# Patient Record
Sex: FEMALE | Race: WHITE | NOT HISPANIC OR LATINO | Employment: PART TIME | URBAN - METROPOLITAN AREA
[De-identification: names, ages, dates, MRNs, and addresses within clinical notes are randomized per-mention and may not be internally consistent; named-entity substitution may affect disease eponyms.]

---

## 2017-01-16 LAB — CRC RECOMMENDATION EXT: NORMAL

## 2017-05-22 ENCOUNTER — LAB VISIT (OUTPATIENT)
Dept: LAB | Facility: HOSPITAL | Age: 67
End: 2017-05-22
Attending: INTERNAL MEDICINE
Payer: MEDICARE

## 2017-05-22 DIAGNOSIS — N18.30 CKD (CHRONIC KIDNEY DISEASE) STAGE 3, GFR 30-59 ML/MIN: ICD-10-CM

## 2017-05-22 LAB
ALBUMIN SERPL BCP-MCNC: 3.6 G/DL
ANION GAP SERPL CALC-SCNC: 8 MMOL/L
BUN SERPL-MCNC: 18 MG/DL
CALCIUM SERPL-MCNC: 9.6 MG/DL
CHLORIDE SERPL-SCNC: 106 MMOL/L
CO2 SERPL-SCNC: 26 MMOL/L
CREAT SERPL-MCNC: 1 MG/DL
EST. GFR  (AFRICAN AMERICAN): >60 ML/MIN/1.73 M^2
EST. GFR  (NON AFRICAN AMERICAN): 58 ML/MIN/1.73 M^2
GLUCOSE SERPL-MCNC: 90 MG/DL
PHOSPHATE SERPL-MCNC: 3 MG/DL
POTASSIUM SERPL-SCNC: 4.6 MMOL/L
SODIUM SERPL-SCNC: 140 MMOL/L

## 2017-05-22 PROCEDURE — 80069 RENAL FUNCTION PANEL: CPT

## 2017-05-22 PROCEDURE — 36415 COLL VENOUS BLD VENIPUNCTURE: CPT | Mod: PO

## 2017-07-31 ENCOUNTER — TELEPHONE (OUTPATIENT)
Dept: NEPHROLOGY | Facility: CLINIC | Age: 67
End: 2017-07-31

## 2017-07-31 NOTE — TELEPHONE ENCOUNTER
----- Message from Basilia Greene sent at 2017 11:14 AM CDT -----  Contact: Pt.  Has an appt with him today at 2:40 p.m. Normally has lab work done before the appt.  Nothing scheduled and last orders states 3/4/2016 ().  Should her appt be rescheduled so lab work can be done first?  Please call her at [phone].

## 2017-08-28 ENCOUNTER — TELEPHONE (OUTPATIENT)
Dept: NEPHROLOGY | Facility: CLINIC | Age: 67
End: 2017-08-28

## 2017-08-28 ENCOUNTER — LAB VISIT (OUTPATIENT)
Dept: LAB | Facility: HOSPITAL | Age: 67
End: 2017-08-28
Attending: INTERNAL MEDICINE
Payer: MEDICARE

## 2017-08-28 DIAGNOSIS — N18.30 CKD (CHRONIC KIDNEY DISEASE) STAGE 3, GFR 30-59 ML/MIN: ICD-10-CM

## 2017-08-28 LAB
ALBUMIN SERPL BCP-MCNC: 3.9 G/DL
ANION GAP SERPL CALC-SCNC: 9 MMOL/L
BUN SERPL-MCNC: 17 MG/DL
CALCIUM SERPL-MCNC: 10.1 MG/DL
CHLORIDE SERPL-SCNC: 106 MMOL/L
CO2 SERPL-SCNC: 24 MMOL/L
CREAT SERPL-MCNC: 1.2 MG/DL
EST. GFR  (AFRICAN AMERICAN): 53.7 ML/MIN/1.73 M^2
EST. GFR  (NON AFRICAN AMERICAN): 46.5 ML/MIN/1.73 M^2
GLUCOSE SERPL-MCNC: 111 MG/DL
PHOSPHATE SERPL-MCNC: 2.8 MG/DL
POTASSIUM SERPL-SCNC: 4.2 MMOL/L
SODIUM SERPL-SCNC: 139 MMOL/L

## 2017-08-28 PROCEDURE — 80069 RENAL FUNCTION PANEL: CPT

## 2017-08-28 PROCEDURE — 36415 COLL VENOUS BLD VENIPUNCTURE: CPT | Mod: PO

## 2017-08-28 NOTE — TELEPHONE ENCOUNTER
----- Message from Divine Fraser sent at 8/28/2017  3:00 PM CDT -----  Contact: Patient  Patient needs to know if the all of the orders  was sent over to the labs, Please call her at [phone]. The one that she is looking for is the urine analysis.    Thanks  td

## 2017-09-06 ENCOUNTER — OFFICE VISIT (OUTPATIENT)
Dept: NEPHROLOGY | Facility: CLINIC | Age: 67
End: 2017-09-06
Payer: MEDICARE

## 2017-09-06 VITALS
HEIGHT: 63 IN | SYSTOLIC BLOOD PRESSURE: 110 MMHG | HEART RATE: 70 BPM | BODY MASS INDEX: 23.25 KG/M2 | DIASTOLIC BLOOD PRESSURE: 60 MMHG | WEIGHT: 131.19 LBS

## 2017-09-06 DIAGNOSIS — N18.30 CKD (CHRONIC KIDNEY DISEASE) STAGE 3, GFR 30-59 ML/MIN: Primary | ICD-10-CM

## 2017-09-06 PROCEDURE — 99999 PR PBB SHADOW E&M-EST. PATIENT-LVL III: CPT | Mod: PBBFAC,,, | Performed by: INTERNAL MEDICINE

## 2017-09-06 PROCEDURE — 99213 OFFICE O/P EST LOW 20 MIN: CPT | Mod: S$PBB,,, | Performed by: INTERNAL MEDICINE

## 2017-09-06 PROCEDURE — 1159F MED LIST DOCD IN RCRD: CPT | Mod: ,,, | Performed by: INTERNAL MEDICINE

## 2017-09-06 PROCEDURE — 99213 OFFICE O/P EST LOW 20 MIN: CPT | Mod: PBBFAC | Performed by: INTERNAL MEDICINE

## 2017-09-06 PROCEDURE — 1126F AMNT PAIN NOTED NONE PRSNT: CPT | Mod: ,,, | Performed by: INTERNAL MEDICINE

## 2017-09-06 NOTE — PROGRESS NOTES
PROGRESS NOTE FOR ESTABLISHED PATIENT    PHYSICIAN REQUESTING THE CONSULT: Dr. Madisyn Cruz     REASON FOR VISIT: Renal insufficiency     67 y.o. female with history of bipolar disorder, hypothyroidism presents to the renal clinic for evaluation of renal insufficiency.     Patient denies any pain, SOB, nausea, LE edema, dizziness. She continues to use lithium.         Past Medical History    Diagnosis  Date      Bipolar 1 disorder       Hypothyroidism       Osteopenia       Past Surgical History    Procedure  Date      Cyst removal       [place] tooth extraction       Tonsillectomy       History      Social History      Marital Status:        Spouse Name:  N/A      Number of Children:  N/A      Years of Education:  N/A      Occupational History      Not on file.      Social History Main Topics      Smoking status:  Never Smoker      Smokeless tobacco:  Never Used      Alcohol Use:  No      Drug Use:  No      Sexually Active:  Not on file      Other Topics  Concern      Not on file      Social History Narrative      No narrative on file      Family History    Problem  Relation  Age of Onset      Heart failure  Mother       Heart failure  Brother       Diabetes  Brother       Cancer  Brother       Breast cancer  Maternal Aunt       No Known Allergies   Current Outpatient Prescriptions    Medication  Sig  Dispense  Refill      azelastine 0.15 % (205.5 mcg) Spry  1 spray by Nasal route.        buPROPion (WELLBUTRIN XL) 300 MG 24 hr tablet  Take 300 mg by mouth once daily.        calcium-vitamin D3 (CALCIUM 500 + D) 500 mg(1,250mg) -200 unit per tablet  Take 1 tablet by mouth 2 (two) times daily with meals.        clonazepam (KLONOPIN) 0.5 MG tablet  Take 0.5 mg by mouth as needed.        duloxetine (CYMBALTA) 60 MG capsule  Take 60 mg by mouth once daily.        ergocalciferol (VITAMIN D2) 50,000 unit Cap  Take 50,000 Units by mouth every 7 days.        fish oil-omega-3 fatty acids  "300-1,000 mg capsule  Take 1 g by mouth once daily.        lamotrigine (LAMICTAL) 100 MG tablet  Take 100 mg by mouth once daily.        lithium (ESKALITH) 300 MG capsule  Take 300 mg by mouth 2 (two) times daily.        magnesium oxide-Mg AA chelate (MG-PLUS-PROTEIN) 133 mg Tab  Take by mouth.        quetiapine (SEROQUEL XR) 150 mg Tb24  Take by mouth once daily.        thyroid, pork, (ARMOUR THYROID) 15 mg Tab  Take by mouth.        trifluoperazine (STELAZINE) 5 MG tablet  Take 5 mg by mouth as needed.        Review of Systems:   1. GENERAL: patient denies any fever, weight changes, generalized weakness, dizziness.   2. HEENT: patient denies headaches, visual disturbances, swallowing problems, sinus pain, nasal congestion.   3. CARDIOVASCULAR: patient denies chest pain, palpitations.   4. PULMONARY: patient denies SOB, coughing, hemoptysis, wheezing.   5. GASTROINTESTINAL: patient denies abdominal pain, nausea, vomiting, diarrhea.   6. GENITOURINARY: patient denies dysuria, hematuria, hesitancy, frequency.   7. EXTREMITIES: patient denies LE edema, LE cramping.   8. DERMATOLOGY: patient denies rashes, ulcers.   9. NEURO: patient denies tremors, extremity weakness, extremity numbness/tingling.   10. MUSCULOSKELETAL: patient denies joint pain, joint swelling.   11. HEMATOLOGY: patient denies rectal or gum bleeding.   12: PSYCH: patient denies anxiety, depression.       PHYSICAL EXAM:   /60   Pulse 70   Ht 5' 3" (1.6 m)   Wt 59.5 kg (131 lb 2.8 oz)   BMI 23.24 kg/m²     GENERAL: Slender pleasant lady presents to renal clinic   HEENT: PERRL, no nasal discharge, no icterus, no oral exudates, moist mucosal membranes.   NECK: no thyroid mass, no lymphadenopathy.   HEART: RRR S1/S2, no rubs, good peripheral pulses.   LUNGS: CTA bilaterally, no wheezing, breathing is nonlabored.   ABDOMEN: soft, nontender, not distended, bowel sounds are present, no abdominal hernia.   EXTREM: no LE edema.   SKIN: bruise in " right periorbital area and right chin, skin is warm and dry.   NEURO: A & O x 3, no obvious focal signs.       LABORATORY RESULTS:   Lab Results   Component Value Date    CREATININE 1.2 08/28/2017    BUN 17 08/28/2017     08/28/2017    K 4.2 08/28/2017     08/28/2017    CO2 24 08/28/2017     Lab Results   Component Value Date    CALCIUM 10.1 08/28/2017    PHOS 2.8 08/28/2017     Lab Results   Component Value Date    ALBUMIN 3.9 08/28/2017     Lab Results   Component Value Date    WBC 3.89 (L) 02/18/2014    HGB 12.9 02/18/2014    HCT 38.6 02/18/2014    MCV 91 02/18/2014     (L) 02/18/2014     Urinalysis: no protein, no RBC (2/15/16)     ASSESSMENT AND PLAN:   67 y.o. female with history of bipolar disorder and hypothyroidism presents to the renal clinic for evaluation of renal insufficiency.     1. Renal insufficiency: Patient's renal function has stabilized with creatinine at 1.2. She continues to be on lithium since her psychiatrist feels that lithium is important for her.  Patient's renal function will be monitored closely and she will return in 6-9 months for follow up. If there is a tendency of worsening renal function with increasing creatinine, lithium should be discontinued.     2. Electrolytes: at goal.     3. Acid base status: No acute issues.     4. Volume: Euvolemic.     5. Blood pressure: Good BP control.     6. Medications: Reviewed. Lithium will be continued for now.

## 2018-09-06 ENCOUNTER — LAB VISIT (OUTPATIENT)
Dept: LAB | Facility: HOSPITAL | Age: 68
End: 2018-09-06
Attending: INTERNAL MEDICINE
Payer: MEDICARE

## 2018-09-06 DIAGNOSIS — N18.30 CKD (CHRONIC KIDNEY DISEASE) STAGE 3, GFR 30-59 ML/MIN: ICD-10-CM

## 2018-09-06 LAB
ALBUMIN SERPL BCP-MCNC: 4 G/DL
ALBUMIN SERPL BCP-MCNC: 4 G/DL
ANION GAP SERPL CALC-SCNC: 5 MMOL/L
ANION GAP SERPL CALC-SCNC: 5 MMOL/L
BUN SERPL-MCNC: 23 MG/DL
BUN SERPL-MCNC: 23 MG/DL
CALCIUM SERPL-MCNC: 10.6 MG/DL
CALCIUM SERPL-MCNC: 10.6 MG/DL
CHLORIDE SERPL-SCNC: 108 MMOL/L
CHLORIDE SERPL-SCNC: 108 MMOL/L
CO2 SERPL-SCNC: 27 MMOL/L
CO2 SERPL-SCNC: 27 MMOL/L
CREAT SERPL-MCNC: 1.2 MG/DL
CREAT SERPL-MCNC: 1.2 MG/DL
EST. GFR  (AFRICAN AMERICAN): 53.3 ML/MIN/1.73 M^2
EST. GFR  (AFRICAN AMERICAN): 53.3 ML/MIN/1.73 M^2
EST. GFR  (NON AFRICAN AMERICAN): 46.2 ML/MIN/1.73 M^2
EST. GFR  (NON AFRICAN AMERICAN): 46.2 ML/MIN/1.73 M^2
GLUCOSE SERPL-MCNC: 89 MG/DL
GLUCOSE SERPL-MCNC: 89 MG/DL
PHOSPHATE SERPL-MCNC: 3.6 MG/DL
PHOSPHATE SERPL-MCNC: 3.6 MG/DL
POTASSIUM SERPL-SCNC: 4.7 MMOL/L
POTASSIUM SERPL-SCNC: 4.7 MMOL/L
SODIUM SERPL-SCNC: 140 MMOL/L
SODIUM SERPL-SCNC: 140 MMOL/L

## 2018-09-06 PROCEDURE — 80069 RENAL FUNCTION PANEL: CPT

## 2018-09-06 PROCEDURE — 36415 COLL VENOUS BLD VENIPUNCTURE: CPT | Mod: PO

## 2018-09-11 ENCOUNTER — TELEPHONE (OUTPATIENT)
Dept: NEPHROLOGY | Facility: CLINIC | Age: 68
End: 2018-09-11

## 2018-09-11 NOTE — TELEPHONE ENCOUNTER
----- Message from Pamela Sánchez sent at 9/11/2018 10:17 AM CDT -----  Contact: self [phone]  Would like return call from nurse.  Please call back at [phone].  Md Karan

## 2018-09-11 NOTE — TELEPHONE ENCOUNTER
Returned call to patient. She is requesting a call with lab results. Patient had to cancel today due to traffic. Please advise

## 2019-01-23 ENCOUNTER — TELEPHONE (OUTPATIENT)
Dept: NEPHROLOGY | Facility: CLINIC | Age: 69
End: 2019-01-23

## 2019-01-23 NOTE — TELEPHONE ENCOUNTER
----- Message from Keke Guerrero sent at 1/23/2019  3:45 PM CST -----  Contact: pt  The pt request a return call, no additional info given, the pt can be reached at [phone]///[id number]

## 2019-01-23 NOTE — TELEPHONE ENCOUNTER
Returned call to patient who states that she would like to have some labs done or do you prefer for her to wait until February with a follow up visit with you? Please advise

## 2019-01-24 NOTE — TELEPHONE ENCOUNTER
Called and left message for patient to return the call. She can repeat labs and schedule a follow up with Dr. Echols next month.

## 2019-02-06 LAB — BMD RECOMMENDATION EXT: NORMAL

## 2020-03-03 ENCOUNTER — TELEPHONE (OUTPATIENT)
Dept: NEPHROLOGY | Facility: CLINIC | Age: 70
End: 2020-03-03

## 2020-03-03 DIAGNOSIS — N18.30 CKD (CHRONIC KIDNEY DISEASE) STAGE 3, GFR 30-59 ML/MIN: Primary | ICD-10-CM

## 2020-03-03 NOTE — TELEPHONE ENCOUNTER
Called to schedule appt she wanted sooner than June so offered this week. She declined .the next avail was may3/3/2020/1042/sf

## 2020-04-30 ENCOUNTER — LAB VISIT (OUTPATIENT)
Dept: LAB | Facility: HOSPITAL | Age: 70
End: 2020-04-30
Attending: INTERNAL MEDICINE
Payer: MEDICARE

## 2020-04-30 ENCOUNTER — TELEPHONE (OUTPATIENT)
Dept: NEPHROLOGY | Facility: CLINIC | Age: 70
End: 2020-04-30

## 2020-04-30 DIAGNOSIS — N18.30 CKD (CHRONIC KIDNEY DISEASE) STAGE 3, GFR 30-59 ML/MIN: ICD-10-CM

## 2020-04-30 LAB
ALBUMIN SERPL BCP-MCNC: 4 G/DL (ref 3.5–5.2)
ANION GAP SERPL CALC-SCNC: 4 MMOL/L (ref 8–16)
BASOPHILS # BLD AUTO: 0.01 K/UL (ref 0–0.2)
BASOPHILS NFR BLD: 0.3 % (ref 0–1.9)
BUN SERPL-MCNC: 15 MG/DL (ref 8–23)
CALCIUM SERPL-MCNC: 10.3 MG/DL (ref 8.7–10.5)
CHLORIDE SERPL-SCNC: 103 MMOL/L (ref 95–110)
CO2 SERPL-SCNC: 31 MMOL/L (ref 23–29)
CREAT SERPL-MCNC: 1.2 MG/DL (ref 0.5–1.4)
DIFFERENTIAL METHOD: ABNORMAL
EOSINOPHIL # BLD AUTO: 0 K/UL (ref 0–0.5)
EOSINOPHIL NFR BLD: 0 % (ref 0–8)
ERYTHROCYTE [DISTWIDTH] IN BLOOD BY AUTOMATED COUNT: 13.3 % (ref 11.5–14.5)
EST. GFR  (AFRICAN AMERICAN): 52.2 ML/MIN/1.73 M^2
EST. GFR  (NON AFRICAN AMERICAN): 45.3 ML/MIN/1.73 M^2
GLUCOSE SERPL-MCNC: 84 MG/DL (ref 70–110)
HCT VFR BLD AUTO: 41.6 % (ref 37–48.5)
HGB BLD-MCNC: 13 G/DL (ref 12–16)
IMM GRANULOCYTES # BLD AUTO: 0 K/UL (ref 0–0.04)
IMM GRANULOCYTES NFR BLD AUTO: 0 % (ref 0–0.5)
LYMPHOCYTES # BLD AUTO: 1 K/UL (ref 1–4.8)
LYMPHOCYTES NFR BLD: 32.3 % (ref 18–48)
MCH RBC QN AUTO: 30.1 PG (ref 27–31)
MCHC RBC AUTO-ENTMCNC: 31.3 G/DL (ref 32–36)
MCV RBC AUTO: 96 FL (ref 82–98)
MONOCYTES # BLD AUTO: 0.2 K/UL (ref 0.3–1)
MONOCYTES NFR BLD: 7.4 % (ref 4–15)
NEUTROPHILS # BLD AUTO: 1.8 K/UL (ref 1.8–7.7)
NEUTROPHILS NFR BLD: 60 % (ref 38–73)
NRBC BLD-RTO: 0 /100 WBC
PHOSPHATE SERPL-MCNC: 3.3 MG/DL (ref 2.7–4.5)
PLATELET # BLD AUTO: 132 K/UL (ref 150–350)
PMV BLD AUTO: 12.7 FL (ref 9.2–12.9)
POTASSIUM SERPL-SCNC: 4.5 MMOL/L (ref 3.5–5.1)
PTH-INTACT SERPL-MCNC: 76 PG/ML (ref 9–77)
RBC # BLD AUTO: 4.32 M/UL (ref 4–5.4)
SODIUM SERPL-SCNC: 138 MMOL/L (ref 136–145)
WBC # BLD AUTO: 2.97 K/UL (ref 3.9–12.7)

## 2020-04-30 PROCEDURE — 83970 ASSAY OF PARATHORMONE: CPT

## 2020-04-30 PROCEDURE — 36415 COLL VENOUS BLD VENIPUNCTURE: CPT | Mod: PO

## 2020-04-30 PROCEDURE — 80069 RENAL FUNCTION PANEL: CPT

## 2020-04-30 PROCEDURE — 85025 COMPLETE CBC W/AUTO DIFF WBC: CPT

## 2020-04-30 NOTE — TELEPHONE ENCOUNTER
----- Message from Thu Pearce sent at 4/30/2020  8:17 AM CDT -----  Contact: pt  States she has to do her lab work today. She has an appt on 5/4 to see Dr Echols and she would like to see if we can just call her with her test results, rather than her coming in. Please call pt [phone]. Thank you

## 2020-04-30 NOTE — TELEPHONE ENCOUNTER
Returned call and spent 20 min walking her through the my chart process. She is now redy for vv .4/30/2020/0845/sf

## 2020-05-04 ENCOUNTER — OFFICE VISIT (OUTPATIENT)
Dept: NEPHROLOGY | Facility: CLINIC | Age: 70
End: 2020-05-04
Payer: MEDICARE

## 2020-05-04 DIAGNOSIS — N18.30 CKD (CHRONIC KIDNEY DISEASE) STAGE 3, GFR 30-59 ML/MIN: Primary | ICD-10-CM

## 2020-05-04 PROCEDURE — 99442 PR PHYSICIAN TELEPHONE EVALUATION 11-20 MIN: ICD-10-PCS | Mod: 95,,, | Performed by: INTERNAL MEDICINE

## 2020-05-04 PROCEDURE — 99442 PR PHYSICIAN TELEPHONE EVALUATION 11-20 MIN: CPT | Mod: 95,,, | Performed by: INTERNAL MEDICINE

## 2020-05-04 NOTE — PROGRESS NOTES
PROGRESS NOTE FOR ESTABLISHED PATIENT    PHYSICIAN REQUESTING THE CONSULT: Dr. Madisyn Cruz     REASON FOR VISIT: Renal insufficiency     Audio Only Telehealth Visit     The patient location is: patient's home  The chief complaint leading to consultation is: CKD  Visit type: Virtual visit with audio only (telephone)  Total time spent with patient: 13 minutes     The reason for the audio only service rather than synchronous audio and video virtual visit was related to technical difficulties or patient preference/necessity.     Each patient to whom I provide medical services by telemedicine is:  (1) informed of the relationship between the physician and patient and the respective role of any other health care provider with respect to management of the patient; and (2) notified that they may decline to receive medical services by telemedicine and may withdraw from such care at any time. Patient verbally consented to receive this service via voice-only telephone call.       HPI: See below     Assessment and plan:  See below     This service was not originating from a related E/M service provided within the previous 7 days nor will  to an E/M service or procedure within the next 24 hours or my soonest available appointment.  Prevailing standard of care was able to be met in this audio-only visit.        71 y.o.  female with history of bipolar disorder, hypothyroidism presents to the renal clinic for evaluation of renal insufficiency.     September 6, 2017:  Patient denies any pain, SOB, nausea, LE edema, dizziness. She continues to use lithium.     May 4, 2020:  Patient denies any complaints. Creatinine stable at 1.2.         Past Medical History    Diagnosis  Date      Bipolar 1 disorder       Hypothyroidism       Osteopenia       Past Surgical History    Procedure  Date      Cyst removal       [place] tooth extraction       Tonsillectomy       History      Social History      Marital Status:         Spouse Name:  N/A      Number of Children:  N/A      Years of Education:  N/A      Occupational History      Not on file.      Social History Main Topics      Smoking status:  Never Smoker      Smokeless tobacco:  Never Used      Alcohol Use:  No      Drug Use:  No      Sexually Active:  Not on file      Other Topics  Concern      Not on file      Social History Narrative      No narrative on file      Family History    Problem  Relation  Age of Onset      Heart failure  Mother       Heart failure  Brother       Diabetes  Brother       Cancer  Brother       Breast cancer  Maternal Aunt       No Known Allergies   Current Outpatient Prescriptions    Medication  Sig  Dispense  Refill      azelastine 0.15 % (205.5 mcg) Spry  1 spray by Nasal route.        buPROPion (WELLBUTRIN XL) 300 MG 24 hr tablet  Take 300 mg by mouth once daily.        calcium-vitamin D3 (CALCIUM 500 + D) 500 mg(1,250mg) -200 unit per tablet  Take 1 tablet by mouth 2 (two) times daily with meals.        clonazepam (KLONOPIN) 0.5 MG tablet  Take 0.5 mg by mouth as needed.        duloxetine (CYMBALTA) 60 MG capsule  Take 60 mg by mouth once daily.        ergocalciferol (VITAMIN D2) 50,000 unit Cap  Take 50,000 Units by mouth every 7 days.        fish oil-omega-3 fatty acids 300-1,000 mg capsule  Take 1 g by mouth once daily.        lamotrigine (LAMICTAL) 100 MG tablet  Take 100 mg by mouth once daily.        lithium (ESKALITH) 300 MG capsule  Take 300 mg by mouth 2 (two) times daily.        magnesium oxide-Mg AA chelate (MG-PLUS-PROTEIN) 133 mg Tab  Take by mouth.        quetiapine (SEROQUEL XR) 150 mg Tb24  Take by mouth once daily.        thyroid, pork, (ARMOUR THYROID) 15 mg Tab  Take by mouth.        trifluoperazine (STELAZINE) 5 MG tablet  Take 5 mg by mouth as needed.        Review of Systems:   1. GENERAL: patient denies any fever, weight changes, generalized weakness, dizziness.   2. HEENT: patient denies headaches,  visual disturbances, swallowing problems, sinus pain, nasal congestion.   3. CARDIOVASCULAR: patient denies chest pain, palpitations.   4. PULMONARY: patient denies SOB, coughing, hemoptysis, wheezing.   5. GASTROINTESTINAL: patient denies abdominal pain, nausea, vomiting, diarrhea.   6. GENITOURINARY: patient denies dysuria, hematuria, hesitancy, frequency.   7. EXTREMITIES: patient denies LE edema, LE cramping.   8. DERMATOLOGY: patient denies rashes, ulcers.   9. NEURO: patient denies tremors, extremity weakness, extremity numbness/tingling.   10. MUSCULOSKELETAL: patient denies joint pain, joint swelling.   11. HEMATOLOGY: patient denies rectal or gum bleeding.   12: PSYCH: patient denies anxiety, depression.       PHYSICAL EXAM:   There were no vitals taken for this visit.    Exam was done during previous visit  GENERAL: Slender pleasant lady presents to renal clinic   HEENT: PERRL, no nasal discharge, no icterus, no oral exudates, moist mucosal membranes.   NECK: no thyroid mass, no lymphadenopathy.   HEART: RRR S1/S2, no rubs, good peripheral pulses.   LUNGS: CTA bilaterally, no wheezing, breathing is nonlabored.   ABDOMEN: soft, nontender, not distended, bowel sounds are present, no abdominal hernia.   EXTREM: no LE edema.   SKIN: bruise in right periorbital area and right chin, skin is warm and dry.   NEURO: A & O x 3, no obvious focal signs.       LABORATORY RESULTS:   Lab Results   Component Value Date    CREATININE 1.2 04/30/2020    BUN 15 04/30/2020     04/30/2020    K 4.5 04/30/2020     04/30/2020    CO2 31 (H) 04/30/2020     Lab Results   Component Value Date    PTH 76.0 04/30/2020    CALCIUM 10.3 04/30/2020    PHOS 3.3 04/30/2020     Lab Results   Component Value Date    ALBUMIN 4.0 04/30/2020     Lab Results   Component Value Date    WBC 2.97 (L) 04/30/2020    HGB 13.0 04/30/2020    HCT 41.6 04/30/2020    MCV 96 04/30/2020     (L) 04/30/2020     Urinalysis: no protein, no RBC  (4/30/20)     ASSESSMENT AND PLAN:   71 y.o.  female with history of bipolar disorder and hypothyroidism presents to the renal clinic for evaluation of renal insufficiency.     1. Renal insufficiency: Patient's renal function has stabilized with creatinine at 1.2. She continues to be on lithium since her psychiatrist feels that lithium is important for her.  Patient's renal function will be monitored closely and she will return in 6 months for follow up. If there is a tendency of worsening renal function with increasing creatinine, lithium should be discontinued.     2. Electrolytes: at goal.     3. Acid base status: No acute issues.     4. Volume: Euvolemic.     5. Blood pressure: Good BP control. She will report home SBP to renal clinic. Past SBP was in 110s.     6. Medications: Reviewed. Lithium will be continued for now.

## 2020-07-07 LAB — BCS RECOMMENDATION EXT: NORMAL

## 2020-08-26 ENCOUNTER — TELEPHONE (OUTPATIENT)
Dept: NEPHROLOGY | Facility: CLINIC | Age: 70
End: 2020-08-26

## 2020-08-26 NOTE — TELEPHONE ENCOUNTER
----- Message from Say Benton sent at 8/26/2020 11:38 AM CDT -----  Pt is requesting a call from nurse to discuss results.            Please call pt back at [phone]

## 2020-08-26 NOTE — TELEPHONE ENCOUNTER
Returned call to patient who wanted to know if her Stage 3 ckd was urgent. Informed her that it is nothing urgent for her to be concerned. Dr. Echols states that she is stable and check her every 6 months. She expressed understanding.

## 2020-11-02 ENCOUNTER — TELEPHONE (OUTPATIENT)
Dept: NEPHROLOGY | Facility: CLINIC | Age: 70
End: 2020-11-02

## 2020-11-02 NOTE — TELEPHONE ENCOUNTER
We have her labs on his desk. Let pt know. She wanted me to compare them but I told he he needs to do that at the upcoming appt.11/2/200/1447/sf

## 2020-11-02 NOTE — TELEPHONE ENCOUNTER
----- Message from Lucita Mallory sent at 11/2/2020  3:09 PM CST -----  Contact: pt  Pt requesting a call back to know if lab results were sent in from her PCP. Please call pt back at [phone]

## 2020-11-09 ENCOUNTER — LAB VISIT (OUTPATIENT)
Dept: LAB | Facility: HOSPITAL | Age: 70
End: 2020-11-09
Attending: INTERNAL MEDICINE
Payer: MEDICARE

## 2020-11-09 DIAGNOSIS — N18.30 CKD (CHRONIC KIDNEY DISEASE) STAGE 3, GFR 30-59 ML/MIN: ICD-10-CM

## 2020-11-09 LAB
ALBUMIN SERPL BCP-MCNC: 3.7 G/DL (ref 3.5–5.2)
ANION GAP SERPL CALC-SCNC: 7 MMOL/L (ref 8–16)
BUN SERPL-MCNC: 18 MG/DL (ref 8–23)
CALCIUM SERPL-MCNC: 9.8 MG/DL (ref 8.7–10.5)
CHLORIDE SERPL-SCNC: 104 MMOL/L (ref 95–110)
CREAT SERPL-MCNC: 0.9 MG/DL (ref 0.5–1.4)
EST. GFR  (AFRICAN AMERICAN): >60 ML/MIN/1.73 M^2
EST. GFR  (NON AFRICAN AMERICAN): >60 ML/MIN/1.73 M^2
GLUCOSE SERPL-MCNC: 94 MG/DL (ref 70–110)
PHOSPHATE SERPL-MCNC: 3.5 MG/DL (ref 2.7–4.5)
POTASSIUM SERPL-SCNC: 4.2 MMOL/L (ref 3.5–5.1)
SODIUM SERPL-SCNC: 141 MMOL/L (ref 136–145)

## 2020-11-09 PROCEDURE — 36415 COLL VENOUS BLD VENIPUNCTURE: CPT | Mod: PO

## 2020-11-09 PROCEDURE — 80069 RENAL FUNCTION PANEL: CPT

## 2020-11-16 ENCOUNTER — TELEPHONE (OUTPATIENT)
Dept: NEPHROLOGY | Facility: CLINIC | Age: 70
End: 2020-11-16

## 2020-11-16 NOTE — TELEPHONE ENCOUNTER
----- Message from Landy Stallings sent at 11/16/2020  8:05 AM CST -----  Pt would like nurse to give her a call about appt. Do she need one or can results be told over the phone. Please call back at [phone]

## 2020-12-08 ENCOUNTER — OFFICE VISIT (OUTPATIENT)
Dept: NEPHROLOGY | Facility: CLINIC | Age: 70
End: 2020-12-08
Payer: MEDICARE

## 2020-12-08 VITALS
HEIGHT: 63 IN | RESPIRATION RATE: 20 BRPM | BODY MASS INDEX: 23.24 KG/M2 | SYSTOLIC BLOOD PRESSURE: 108 MMHG | HEART RATE: 74 BPM | DIASTOLIC BLOOD PRESSURE: 70 MMHG

## 2020-12-08 DIAGNOSIS — N18.2 CKD (CHRONIC KIDNEY DISEASE) STAGE 2, GFR 60-89 ML/MIN: Primary | ICD-10-CM

## 2020-12-08 PROCEDURE — 99213 PR OFFICE/OUTPT VISIT, EST, LEVL III, 20-29 MIN: ICD-10-PCS | Mod: S$PBB,,, | Performed by: INTERNAL MEDICINE

## 2020-12-08 PROCEDURE — 99999 PR PBB SHADOW E&M-EST. PATIENT-LVL III: ICD-10-PCS | Mod: PBBFAC,,, | Performed by: INTERNAL MEDICINE

## 2020-12-08 PROCEDURE — 99999 PR PBB SHADOW E&M-EST. PATIENT-LVL III: CPT | Mod: PBBFAC,,, | Performed by: INTERNAL MEDICINE

## 2020-12-08 PROCEDURE — 99213 OFFICE O/P EST LOW 20 MIN: CPT | Mod: PBBFAC,PO | Performed by: INTERNAL MEDICINE

## 2020-12-08 PROCEDURE — 99213 OFFICE O/P EST LOW 20 MIN: CPT | Mod: S$PBB,,, | Performed by: INTERNAL MEDICINE

## 2020-12-08 RX ORDER — OLANZAPINE 10 MG/1
10 TABLET ORAL NIGHTLY
COMMUNITY
End: 2022-03-08

## 2020-12-08 RX ORDER — DIVALPROEX SODIUM 250 MG/1
250 TABLET, DELAYED RELEASE ORAL
COMMUNITY
End: 2022-09-29

## 2020-12-08 NOTE — PROGRESS NOTES
PROGRESS NOTE FOR ESTABLISHED PATIENT    PHYSICIAN REQUESTING THE CONSULT: Dr. Madisyn Cruz     REASON FOR VISIT: Renal insufficiency         70 y.o.  female with history of bipolar disorder, hypothyroidism presents to the renal clinic for evaluation of renal insufficiency.     September 6, 2017:  Patient denies any pain, SOB, nausea, LE edema, dizziness. She continues to use lithium.     May 4, 2020:  Patient denies any complaints. Creatinine stable at 1.2.     December 8, 2020:  Creatinine at 0.9. Patient reports that her lithium was stopped 1-2 months ago.         Past Medical History    Diagnosis  Date      Bipolar 1 disorder       Hypothyroidism       Osteopenia       Past Surgical History    Procedure  Date      Cyst removal       [place] tooth extraction       Tonsillectomy       History      Social History      Marital Status:        Spouse Name:  N/A      Number of Children:  N/A      Years of Education:  N/A      Occupational History      Not on file.      Social History Main Topics      Smoking status:  Never Smoker      Smokeless tobacco:  Never Used      Alcohol Use:  No      Drug Use:  No      Sexually Active:  Not on file      Other Topics  Concern      Not on file      Social History Narrative      No narrative on file      Family History    Problem  Relation  Age of Onset      Heart failure  Mother       Heart failure  Brother       Diabetes  Brother       Cancer  Brother       Breast cancer  Maternal Aunt       No Known Allergies   Current Outpatient Prescriptions    Medication  Sig  Dispense  Refill      azelastine 0.15 % (205.5 mcg) Spry  1 spray by Nasal route.        buPROPion (WELLBUTRIN XL) 300 MG 24 hr tablet  Take 300 mg by mouth once daily.        calcium-vitamin D3 (CALCIUM 500 + D) 500 mg(1,250mg) -200 unit per tablet  Take 1 tablet by mouth 2 (two) times daily with meals.        clonazepam (KLONOPIN) 0.5 MG tablet  Take 0.5 mg by mouth as needed.         "duloxetine (CYMBALTA) 60 MG capsule  Take 60 mg by mouth once daily.        ergocalciferol (VITAMIN D2) 50,000 unit Cap  Take 50,000 Units by mouth every 7 days.        fish oil-omega-3 fatty acids 300-1,000 mg capsule  Take 1 g by mouth once daily.        lamotrigine (LAMICTAL) 100 MG tablet  Take 100 mg by mouth once daily.        lithium (ESKALITH) 300 MG capsule  Take 300 mg by mouth 2 (two) times daily.        magnesium oxide-Mg AA chelate (MG-PLUS-PROTEIN) 133 mg Tab  Take by mouth.        quetiapine (SEROQUEL XR) 150 mg Tb24  Take by mouth once daily.        thyroid, pork, (ARMOUR THYROID) 15 mg Tab  Take by mouth.        trifluoperazine (STELAZINE) 5 MG tablet  Take 5 mg by mouth as needed.        Review of Systems:   1. GENERAL: patient denies any fever, weight changes, generalized weakness, dizziness.   2. HEENT: patient denies headaches, visual disturbances, swallowing problems, sinus pain, nasal congestion.   3. CARDIOVASCULAR: patient denies chest pain, palpitations.   4. PULMONARY: patient denies SOB, coughing, hemoptysis, wheezing.   5. GASTROINTESTINAL: patient denies abdominal pain, nausea, vomiting, diarrhea.   6. GENITOURINARY: patient denies dysuria, hematuria, hesitancy, frequency.   7. EXTREMITIES: patient denies LE edema, LE cramping.   8. DERMATOLOGY: patient denies rashes, ulcers.   9. NEURO: patient denies tremors, extremity weakness, extremity numbness/tingling.   10. MUSCULOSKELETAL: patient denies joint pain, joint swelling.   11. HEMATOLOGY: patient denies rectal or gum bleeding.   12: PSYCH: patient denies anxiety, depression.       PHYSICAL EXAM:   /70 (BP Location: Left arm)   Pulse 74   Resp 20   Ht 5' 3" (1.6 m)   BMI 23.24 kg/m²       GENERAL: Slender pleasant lady presents to renal clinic   HEENT: PERRL, no nasal discharge, no icterus, no oral exudates, moist mucosal membranes.   NECK: no thyroid mass, no lymphadenopathy.   HEART: RRR S1/S2, no rubs, good " peripheral pulses.   LUNGS: CTA bilaterally, no wheezing, breathing is nonlabored.   ABDOMEN: soft, nontender, not distended, bowel sounds are present, no abdominal hernia.   EXTREM: no LE edema.   SKIN: bruise in right periorbital area and right chin, skin is warm and dry.   NEURO: A & O x 3, no obvious focal signs.       LABORATORY RESULTS:   Lab Results   Component Value Date    CREATININE 0.9 11/09/2020    BUN 18 11/09/2020     11/09/2020    K 4.2 11/09/2020     11/09/2020    CO2 31 (H) 04/30/2020     Lab Results   Component Value Date    PTH 76.0 04/30/2020    CALCIUM 9.8 11/09/2020    PHOS 3.5 11/09/2020     Lab Results   Component Value Date    ALBUMIN 3.7 11/09/2020     Lab Results   Component Value Date    WBC 2.97 (L) 04/30/2020    HGB 13.0 04/30/2020    HCT 41.6 04/30/2020    MCV 96 04/30/2020     (L) 04/30/2020     Urinalysis: no protein, no RBC (11/9/20)     ASSESSMENT AND PLAN:   70 y.o.  female with history of bipolar disorder and hypothyroidism presents to the renal clinic for evaluation of renal insufficiency.     1. Renal insufficiency: Patient's renal function has stabilized with creatinine at 0.9. Her lithium was stopped about 1-2 months ago. Patient's renal function will be monitored closely and she will return in 6 months for follow up. No proteinuria/hematuria.     2. Electrolytes: at goal.     3. Acid base status: No acute issues.     4. Volume: Euvolemic.     5. Blood pressure: Good BP control.     6. Medications: Reviewed. Lithium was stopped.

## 2022-03-04 ENCOUNTER — OFFICE VISIT (OUTPATIENT)
Dept: INTERNAL MEDICINE | Facility: CLINIC | Age: 72
End: 2022-03-04
Payer: MEDICARE

## 2022-03-04 ENCOUNTER — LAB VISIT (OUTPATIENT)
Dept: LAB | Facility: HOSPITAL | Age: 72
End: 2022-03-04
Attending: FAMILY MEDICINE
Payer: MEDICARE

## 2022-03-04 VITALS
HEART RATE: 75 BPM | DIASTOLIC BLOOD PRESSURE: 70 MMHG | SYSTOLIC BLOOD PRESSURE: 116 MMHG | WEIGHT: 127.19 LBS | RESPIRATION RATE: 18 BRPM | OXYGEN SATURATION: 97 % | BODY MASS INDEX: 22.54 KG/M2 | HEIGHT: 63 IN | TEMPERATURE: 98 F

## 2022-03-04 DIAGNOSIS — Z79.899 ENCOUNTER FOR LONG-TERM (CURRENT) USE OF MEDICATIONS: ICD-10-CM

## 2022-03-04 DIAGNOSIS — E03.9 HYPOTHYROIDISM, UNSPECIFIED TYPE: ICD-10-CM

## 2022-03-04 DIAGNOSIS — Z11.4 SCREENING FOR HIV (HUMAN IMMUNODEFICIENCY VIRUS): ICD-10-CM

## 2022-03-04 DIAGNOSIS — Z11.59 NEED FOR HEPATITIS C SCREENING TEST: ICD-10-CM

## 2022-03-04 DIAGNOSIS — R41.3 MEMORY IMPAIRMENT: ICD-10-CM

## 2022-03-04 DIAGNOSIS — F31.9 BIPOLAR 1 DISORDER: ICD-10-CM

## 2022-03-04 DIAGNOSIS — N18.31 STAGE 3A CHRONIC KIDNEY DISEASE: ICD-10-CM

## 2022-03-04 DIAGNOSIS — N18.31 STAGE 3A CHRONIC KIDNEY DISEASE: Primary | ICD-10-CM

## 2022-03-04 LAB
ALBUMIN SERPL BCP-MCNC: 4.1 G/DL (ref 3.5–5.2)
ALP SERPL-CCNC: 50 U/L (ref 55–135)
ALT SERPL W/O P-5'-P-CCNC: 15 U/L (ref 10–44)
ANION GAP SERPL CALC-SCNC: 10 MMOL/L (ref 8–16)
AST SERPL-CCNC: 20 U/L (ref 10–40)
BASOPHILS # BLD AUTO: 0.01 K/UL (ref 0–0.2)
BASOPHILS NFR BLD: 0.3 % (ref 0–1.9)
BILIRUB SERPL-MCNC: 0.5 MG/DL (ref 0.1–1)
BUN SERPL-MCNC: 20 MG/DL (ref 8–23)
CALCIUM SERPL-MCNC: 10.2 MG/DL (ref 8.7–10.5)
CHLORIDE SERPL-SCNC: 104 MMOL/L (ref 95–110)
CHOLEST SERPL-MCNC: 193 MG/DL (ref 120–199)
CHOLEST/HDLC SERPL: 3.6 {RATIO} (ref 2–5)
CO2 SERPL-SCNC: 27 MMOL/L (ref 23–29)
CREAT SERPL-MCNC: 1 MG/DL (ref 0.5–1.4)
DIFFERENTIAL METHOD: ABNORMAL
EOSINOPHIL # BLD AUTO: 0 K/UL (ref 0–0.5)
EOSINOPHIL NFR BLD: 0 % (ref 0–8)
ERYTHROCYTE [DISTWIDTH] IN BLOOD BY AUTOMATED COUNT: 13.4 % (ref 11.5–14.5)
EST. GFR  (AFRICAN AMERICAN): >60 ML/MIN/1.73 M^2
EST. GFR  (NON AFRICAN AMERICAN): 56.4 ML/MIN/1.73 M^2
ESTIMATED AVG GLUCOSE: 94 MG/DL (ref 68–131)
GLUCOSE SERPL-MCNC: 85 MG/DL (ref 70–110)
HBA1C MFR BLD: 4.9 % (ref 4–5.6)
HCT VFR BLD AUTO: 40.8 % (ref 37–48.5)
HDLC SERPL-MCNC: 54 MG/DL (ref 40–75)
HDLC SERPL: 28 % (ref 20–50)
HGB BLD-MCNC: 12.8 G/DL (ref 12–16)
IMM GRANULOCYTES # BLD AUTO: 0.01 K/UL (ref 0–0.04)
IMM GRANULOCYTES NFR BLD AUTO: 0.3 % (ref 0–0.5)
LDLC SERPL CALC-MCNC: 123 MG/DL (ref 63–159)
LYMPHOCYTES # BLD AUTO: 1 K/UL (ref 1–4.8)
LYMPHOCYTES NFR BLD: 30 % (ref 18–48)
MCH RBC QN AUTO: 30.2 PG (ref 27–31)
MCHC RBC AUTO-ENTMCNC: 31.4 G/DL (ref 32–36)
MCV RBC AUTO: 96 FL (ref 82–98)
MONOCYTES # BLD AUTO: 0.3 K/UL (ref 0.3–1)
MONOCYTES NFR BLD: 8.2 % (ref 4–15)
NEUTROPHILS # BLD AUTO: 2.1 K/UL (ref 1.8–7.7)
NEUTROPHILS NFR BLD: 61.2 % (ref 38–73)
NONHDLC SERPL-MCNC: 139 MG/DL
NRBC BLD-RTO: 0 /100 WBC
PLATELET # BLD AUTO: 166 K/UL (ref 150–450)
PMV BLD AUTO: 11.2 FL (ref 9.2–12.9)
POTASSIUM SERPL-SCNC: 4.1 MMOL/L (ref 3.5–5.1)
PROT SERPL-MCNC: 7 G/DL (ref 6–8.4)
RBC # BLD AUTO: 4.24 M/UL (ref 4–5.4)
SODIUM SERPL-SCNC: 141 MMOL/L (ref 136–145)
TRIGL SERPL-MCNC: 80 MG/DL (ref 30–150)
TSH SERPL DL<=0.005 MIU/L-ACNC: 2.36 UIU/ML (ref 0.4–4)
VIT B12 SERPL-MCNC: 402 PG/ML (ref 210–950)
WBC # BLD AUTO: 3.43 K/UL (ref 3.9–12.7)

## 2022-03-04 PROCEDURE — 87389 HIV-1 AG W/HIV-1&-2 AB AG IA: CPT | Performed by: FAMILY MEDICINE

## 2022-03-04 PROCEDURE — 86592 SYPHILIS TEST NON-TREP QUAL: CPT | Performed by: FAMILY MEDICINE

## 2022-03-04 PROCEDURE — 80061 LIPID PANEL: CPT | Performed by: FAMILY MEDICINE

## 2022-03-04 PROCEDURE — 80053 COMPREHEN METABOLIC PANEL: CPT | Performed by: FAMILY MEDICINE

## 2022-03-04 PROCEDURE — 99204 OFFICE O/P NEW MOD 45 MIN: CPT | Mod: S$PBB,,, | Performed by: FAMILY MEDICINE

## 2022-03-04 PROCEDURE — 99999 PR PBB SHADOW E&M-EST. PATIENT-LVL V: CPT | Mod: PBBFAC,,, | Performed by: FAMILY MEDICINE

## 2022-03-04 PROCEDURE — 86803 HEPATITIS C AB TEST: CPT | Performed by: FAMILY MEDICINE

## 2022-03-04 PROCEDURE — 85025 COMPLETE CBC W/AUTO DIFF WBC: CPT | Performed by: FAMILY MEDICINE

## 2022-03-04 PROCEDURE — 83036 HEMOGLOBIN GLYCOSYLATED A1C: CPT | Performed by: FAMILY MEDICINE

## 2022-03-04 PROCEDURE — 82607 VITAMIN B-12: CPT | Performed by: FAMILY MEDICINE

## 2022-03-04 PROCEDURE — 99204 PR OFFICE/OUTPT VISIT, NEW, LEVL IV, 45-59 MIN: ICD-10-PCS | Mod: S$PBB,,, | Performed by: FAMILY MEDICINE

## 2022-03-04 PROCEDURE — 99999 PR PBB SHADOW E&M-EST. PATIENT-LVL V: ICD-10-PCS | Mod: PBBFAC,,, | Performed by: FAMILY MEDICINE

## 2022-03-04 PROCEDURE — 99215 OFFICE O/P EST HI 40 MIN: CPT | Mod: PBBFAC | Performed by: FAMILY MEDICINE

## 2022-03-04 PROCEDURE — 36415 COLL VENOUS BLD VENIPUNCTURE: CPT | Performed by: FAMILY MEDICINE

## 2022-03-04 PROCEDURE — 84443 ASSAY THYROID STIM HORMONE: CPT | Performed by: FAMILY MEDICINE

## 2022-03-04 RX ORDER — QUETIAPINE FUMARATE 25 MG/1
25 TABLET, FILM COATED ORAL NIGHTLY
COMMUNITY
End: 2022-09-29

## 2022-03-04 NOTE — PROGRESS NOTES
Subjective:       Patient ID: María Henson is a 71 y.o. female.    Chief Complaint: Establish Care    HPI     71 F    Patient Active Problem List   Diagnosis    CKD (chronic kidney disease) stage 3, GFR 30-59 ml/min    Bipolar 1 disorder    Hypothyroidism       Past Medical History:   Diagnosis Date    Bipolar 1 disorder     Hypothyroidism     Osteopenia        Past Surgical History:   Procedure Laterality Date    CYST REMOVAL      TONSILLECTOMY      WISDOM TOOTH EXTRACTION         Family History   Problem Relation Age of Onset    Heart failure Mother     Heart failure Brother     Diabetes Brother     Cancer Brother     Breast cancer Maternal Aunt        Social History     Tobacco Use   Smoking Status Former Smoker   Smokeless Tobacco Never Used       Wt Readings from Last 5 Encounters:   03/04/22 57.7 kg (127 lb 3.3 oz)   09/06/17 59.5 kg (131 lb 2.8 oz)   03/04/16 60.4 kg (133 lb 2.5 oz)   07/27/15 60.2 kg (132 lb 12.8 oz)   03/26/15 57.9 kg (127 lb 11.2 oz)       For further HPI details, see assessment and plan.    Review of Systems   HENT: Negative for trouble swallowing.    Respiratory: Negative for shortness of breath.    Cardiovascular: Negative for chest pain.   Skin: Negative for color change.   Psychiatric/Behavioral:        Memory deficits       Objective:      Vitals:    03/04/22 0812   BP: 116/70   Pulse: 75   Resp: 18   Temp: 97.7 °F (36.5 °C)       Physical Exam  Constitutional:       General: She is not in acute distress.     Appearance: Normal appearance. She is well-developed.   Cardiovascular:      Rate and Rhythm: Normal rate and regular rhythm.   Pulmonary:      Effort: Pulmonary effort is normal. No respiratory distress.      Breath sounds: Normal breath sounds.   Musculoskeletal:         General: Normal range of motion.   Neurological:      Mental Status: She is alert. She is not disoriented.   Psychiatric:         Mood and Affect: Mood normal.         Speech: Speech  normal.         Assessment:       1. Stage 3a chronic kidney disease    2. Bipolar 1 disorder    3. Hypothyroidism, unspecified type    4. Screening for HIV (human immunodeficiency virus)    5. Need for hepatitis C screening test    6. Encounter for long-term (current) use of medications        Plan:   Stage 3a chronic kidney disease  -     CBC Auto Differential; Future; Expected date: 03/04/2022  -     Comprehensive Metabolic Panel; Future; Expected date: 03/04/2022  -     Hemoglobin A1C; Future; Expected date: 03/04/2022  -     Lipid Panel; Future; Expected date: 03/04/2022    Bipolar 1 disorder    Hypothyroidism, unspecified type  -     TSH; Future; Expected date: 03/04/2022    Screening for HIV (human immunodeficiency virus)  -     HIV 1/2 Ag/Ab (4th Gen); Future; Expected date: 03/04/2022    Need for hepatitis C screening test  -     Hepatitis C Antibody; Future; Expected date: 03/04/2022    Encounter for long-term (current) use of medications  -     CBC Auto Differential; Future; Expected date: 03/04/2022  -     Comprehensive Metabolic Panel; Future; Expected date: 03/04/2022  -     Hemoglobin A1C; Future; Expected date: 03/04/2022  -     Lipid Panel; Future; Expected date: 03/04/2022  -     TSH; Future; Expected date: 03/04/2022        CKD III  Patient was uncertain about this diagnosis  She state her renal function was in normal range    This is important to monitor given her long term use of medications.    Bipolar  Managed by psychiatry  Dr. Sixto Cross  - Lithium -  450 mg  - lamictal 100 in am - 150 in pm  - seroquel - 25  - cymbalta 90 - takes the 60 and 30 mg.  - klonopin - take 0.5 at night and occasionally another on as needed    Has had episode when they tried to stop lithium - has bad reaction.  Will need to monitor renal function    Hypothyroidism  On cytomel 5 mcg  She states she started taking cytomel out of hopes it is better than depression.  I am not familiar with that and uncertain.  As  long her thyroid levels are ok I believe it would be reasonable.     Allergy issues  flonase  Azelastine    Colon cancer screening  Will get records sent to me.    Due for 2nd shingles shot  Doesn't want to do it today    This note was verbally dictated, please excuse any type errors.

## 2022-03-05 LAB — RPR SER QL: NORMAL

## 2022-03-07 ENCOUNTER — TELEPHONE (OUTPATIENT)
Dept: INTERNAL MEDICINE | Facility: CLINIC | Age: 72
End: 2022-03-07
Payer: MEDICARE

## 2022-03-07 ENCOUNTER — PATIENT OUTREACH (OUTPATIENT)
Dept: ADMINISTRATIVE | Facility: HOSPITAL | Age: 72
End: 2022-03-07
Payer: MEDICARE

## 2022-03-07 NOTE — TELEPHONE ENCOUNTER
Called. I made no changes but rather updated list to reflect accurate list. Will ask staff to read my note and remedy list for accuracy.

## 2022-03-07 NOTE — TELEPHONE ENCOUNTER
----- Message from Bijan Serrato sent at 3/7/2022  2:02 PM CST -----  Contact: self  Pt would like to consult with nurse regarding possible medication changes.  Pt states she received a printout of her medictaions and there were no changes.  Please contact María Henson @ [phone].  Thanks/As

## 2022-03-07 NOTE — PROGRESS NOTES
Uploaded NATALIIA COLONOSCOPY 3/4/2022 ; faxed to Dr. Cisco Dyson 1x to request. Remind me set 1 week.

## 2022-03-09 LAB — HCV AB SERPL QL IA: NEGATIVE

## 2022-03-10 LAB — HIV 1+2 AB+HIV1 P24 AG SERPL QL IA: NEGATIVE

## 2022-03-14 NOTE — PROGRESS NOTES
2nd attempt  NATALIIA COLONOSCOPY 3/4/2022 ; faxed to Dr. Cisco Dyson 1x to request. Remind me set 1 week.

## 2022-04-07 ENCOUNTER — TELEPHONE (OUTPATIENT)
Dept: INTERNAL MEDICINE | Facility: CLINIC | Age: 72
End: 2022-04-07
Payer: MEDICARE

## 2022-04-07 NOTE — TELEPHONE ENCOUNTER
----- Message from Yosvany Hensley sent at 4/7/2022  3:05 PM CDT -----  Contact: PT  Calling to see if she left her medicaid card at the office. Call back at [phone]

## 2022-04-07 NOTE — TELEPHONE ENCOUNTER
----- Message from Cricket Blevins sent at 4/7/2022 10:04 AM CDT -----  Contact: Patient [phone]  Pt called in regards to speaking with a nurse she has a concern please advise

## 2022-04-14 ENCOUNTER — TELEPHONE (OUTPATIENT)
Dept: INTERNAL MEDICINE | Facility: CLINIC | Age: 72
End: 2022-04-14

## 2022-04-14 DIAGNOSIS — R41.3 MEMORY IMPAIRMENT: Primary | ICD-10-CM

## 2022-04-14 NOTE — TELEPHONE ENCOUNTER
----- Message from Catia Fraser sent at 4/14/2022  3:43 PM CDT -----  Pt is calling in regards to getting a referral to see a Neuro outside of [facility]. Pt would like the nurse to call her once the referral is sent.     [facility]       Pt can be reached at [phone] (home)

## 2022-04-22 ENCOUNTER — PATIENT OUTREACH (OUTPATIENT)
Dept: ADMINISTRATIVE | Facility: HOSPITAL | Age: 72
End: 2022-04-22
Payer: MEDICARE

## 2022-07-05 ENCOUNTER — TELEPHONE (OUTPATIENT)
Dept: INTERNAL MEDICINE | Facility: CLINIC | Age: 72
End: 2022-07-05
Payer: MEDICARE

## 2022-07-05 NOTE — TELEPHONE ENCOUNTER
----- Message from Oralia Chavez sent at 7/5/2022  3:47 PM CDT -----  Contact: Patient  Patient called to consult with nurse or staff regarding an MRI. She states she had one done around the month of April and is not sure where the MRI was done and wanted to get that information. Patient would like a call back and can be reached at [phone]. Thanks/MR

## 2022-07-05 NOTE — TELEPHONE ENCOUNTER
Patient called about trying to prove that she was seen by MANOJ Cardenas in Neurology in April.  Also called about her MRI results which we have no record of.

## 2022-07-25 ENCOUNTER — TELEPHONE (OUTPATIENT)
Dept: INTERNAL MEDICINE | Facility: CLINIC | Age: 72
End: 2022-07-25
Payer: MEDICARE

## 2022-07-25 NOTE — TELEPHONE ENCOUNTER
----- Message from Rose Tabor sent at 7/25/2022  4:44 PM CDT -----  Pt called in re: changing thyroid medication, she is looking for a less expensive drug there are NO refills left on her current thyroid meds liothyronine (CYTOMEL) 5 MCG Tab.  Please call pt @ .[phone] to advise     Thanks bs        Patient arrived at the office yesterday to have her pre-visit labs drawn. As outlined by Heidy Rodriguez, she was initially quite upset and was taken into a patient room. Jennifer sat with her for the duration of the time she was in the office. She did show pressured speech and labile mood, but  did not demonstrate any thoughts or intent of hurting herself or others. Discussed calling EMS to take her to hospital, but she refused. She expressed that she wished to go home, put on her cervical collar, take her medications, and then was going to visit her mother in hospice. Given that she did not appear to be a danger to herself or others, she was allowed to leave.  Her psychiatrist was contacted and an appointment was made for 2/16/2018 at 9 am.

## 2022-08-15 ENCOUNTER — TELEPHONE (OUTPATIENT)
Dept: NEUROLOGY | Facility: CLINIC | Age: 72
End: 2022-08-15
Payer: MEDICARE

## 2022-08-15 ENCOUNTER — TELEPHONE (OUTPATIENT)
Dept: INTERNAL MEDICINE | Facility: CLINIC | Age: 72
End: 2022-08-15
Payer: MEDICARE

## 2022-08-15 NOTE — TELEPHONE ENCOUNTER
----- Message from Audrey Mccray sent at 8/15/2022  2:23 PM CDT -----  Regardin nd referral  Contact: patient  Patient states that she heeds to speak to nurse about a second referral, please call her back at  PHD Chowdhury, cannot see a substitute until 22,. Please call her back at [phone]

## 2022-08-15 NOTE — TELEPHONE ENCOUNTER
----- Message from Amanda Soto sent at 8/15/2022  1:15 PM CDT -----  Regarding: Advice  Contact: Patient  Patient would like a call back concerning her upcoming appointment and what will take place at that appointment. Please call at ph .[phone] (home)

## 2022-08-15 NOTE — TELEPHONE ENCOUNTER
Patient is being scheduled with MANOJ Cardenas for 08/24/22 and has been added to the waiting list in case of sooner opening.

## 2022-08-15 NOTE — TELEPHONE ENCOUNTER
----- Message from Oralia Chavez sent at 8/15/2022  1:58 PM CDT -----  Contact: Patient  Patient called to consult with nurse or staff regarding a second referral for memory disorder consultation. She would like to speak with nurse and would like a call back at [phone]. Thanks/MR

## 2022-08-22 ENCOUNTER — TELEPHONE (OUTPATIENT)
Dept: INTERNAL MEDICINE | Facility: CLINIC | Age: 72
End: 2022-08-22
Payer: MEDICARE

## 2022-08-22 NOTE — TELEPHONE ENCOUNTER
----- Message from Pelon Wyman sent at 8/22/2022  1:15 PM CDT -----  Contact: self  ..Type:  Patient Returning Call    Who Called:.María Henson  Who Left Message for Patient: Suhas  Does the patient know what this is regarding?: apt   Would the patient rather a call back or a response via [facility]?  Call back   Best Call Back Number:.[phone]   Additional Information: pt states she missed a call

## 2022-08-22 NOTE — TELEPHONE ENCOUNTER
----- Message from Irina Miles sent at 8/22/2022 10:25 AM CDT -----  Patient would like a  call back at  [phone] in regards to getting an appointment today for purple spots across her body.    Thanks

## 2022-08-23 ENCOUNTER — OFFICE VISIT (OUTPATIENT)
Dept: INTERNAL MEDICINE | Facility: CLINIC | Age: 72
End: 2022-08-23
Payer: MEDICARE

## 2022-08-23 VITALS
BODY MASS INDEX: 23.12 KG/M2 | HEART RATE: 85 BPM | SYSTOLIC BLOOD PRESSURE: 126 MMHG | TEMPERATURE: 98 F | DIASTOLIC BLOOD PRESSURE: 72 MMHG | WEIGHT: 130.5 LBS | HEIGHT: 63 IN | OXYGEN SATURATION: 97 %

## 2022-08-23 DIAGNOSIS — R58 ECCHYMOSIS: Primary | ICD-10-CM

## 2022-08-23 DIAGNOSIS — N18.31 STAGE 3A CHRONIC KIDNEY DISEASE: ICD-10-CM

## 2022-08-23 DIAGNOSIS — R41.3 MEMORY IMPAIRMENT: ICD-10-CM

## 2022-08-23 PROCEDURE — 99999 PR PBB SHADOW E&M-EST. PATIENT-LVL IV: ICD-10-PCS | Mod: PBBFAC,,,

## 2022-08-23 PROCEDURE — 99213 PR OFFICE/OUTPT VISIT, EST, LEVL III, 20-29 MIN: ICD-10-PCS | Mod: S$PBB,,,

## 2022-08-23 PROCEDURE — 99999 PR PBB SHADOW E&M-EST. PATIENT-LVL IV: CPT | Mod: PBBFAC,,,

## 2022-08-23 PROCEDURE — 99213 OFFICE O/P EST LOW 20 MIN: CPT | Mod: S$PBB,,,

## 2022-08-23 PROCEDURE — 99214 OFFICE O/P EST MOD 30 MIN: CPT | Mod: PBBFAC

## 2022-08-23 RX ORDER — CLOPIDOGREL BISULFATE 75 MG/1
75 TABLET ORAL DAILY
COMMUNITY
Start: 2022-07-26 | End: 2022-09-19

## 2022-08-23 RX ORDER — LUMATEPERONE 42 MG/1
CAPSULE ORAL
COMMUNITY
End: 2023-10-03

## 2022-08-23 NOTE — PROGRESS NOTES
María Henson  08/23/2022  [id number]    Yair Bolaños MD  Patient Care Team:  Yair Bolaños MD as PCP - General (Family Medicine)  Denice Galicia MD as Obstetrician (Obstetrics and Gynecology)  PEDRO PABLO Dyson MD as Consulting Physician (Gastroenterology)          Visit Type:an urgent visit for a new problem    Chief Complaint:  Chief Complaint   Patient presents with    Bleeding/Bruising       History of Present Illness:    72 year old female presents today with her  for bruising    Started on Plavix recently by a provider outside of [facility]  She thought she was started on Plavix for memory problems  Does not recall discussing the medication during the visit  Received an alert from her pharmacy that the medication was ready  She has been on the med for 3-4 weeks    Had US done of her carotid arteries  Not sure what that showed  Had some other test done as well      History:  Past Medical History:   Diagnosis Date    Bipolar 1 disorder     Hypothyroidism     Osteopenia      Past Surgical History:   Procedure Laterality Date    CYST REMOVAL      TONSILLECTOMY      WISDOM TOOTH EXTRACTION       Family History   Problem Relation Age of Onset    Heart failure Mother     Heart failure Brother     Diabetes Brother     Cancer Brother     Breast cancer Maternal Aunt      Social History     Socioeconomic History    Marital status:    Tobacco Use    Smoking status: Former Smoker    Smokeless tobacco: Never Used   Substance and Sexual Activity    Alcohol use: No    Drug use: No    Sexual activity: Not Currently     Patient Active Problem List   Diagnosis    CKD (chronic kidney disease) stage 3, GFR 30-59 ml/min    Bipolar 1 disorder    Hypothyroidism     Review of patient's allergies indicates:  No Known Allergies    The following were reviewed at this visit: active problem list, medication list, allergies, family history, social history, and health  maintenance.    Medications:  Current Outpatient Medications on File Prior to Visit   Medication Sig Dispense Refill    ascorbic acid (CYTO C ORAL) Take by mouth.      azelastine 0.15 % (205.5 mcg) Spry 1 spray by Nasal route 2 (two) times daily as needed.       clonazepam (KLONOPIN) 0.5 MG tablet Take 1.5 mg by mouth daily as needed.      clopidogreL (PLAVIX) 75 mg tablet Take 75 mg by mouth once daily.      DULoxetine (CYMBALTA) 60 MG capsule Take 60 mg by mouth once daily.      fluticasone propionate (FLONASE) 50 mcg/actuation nasal spray SPRAY 1 SPRAY PER NOSTRIL TWICE DAILY.      lamotrigine (LAMICTAL) 100 MG tablet Take 200 mg by mouth 2 (two) times daily. Take one tablet in the morning and 1.5 tablet in the evening by mouth daily.      liothyronine (CYTOMEL) 5 MCG Tab TAKE 1 TABLET IN THE MORNING AND 1 TABLET IN THE AFTERNOON ON AN EMPTY STOMACH AS DIRECTED      lithium (ESKALITH) 450 MG TbSR Take 450 mg by mouth once daily.      lumateperone (CAPLYTA) 42 mg Cap Take by mouth.      QUEtiapine (SEROQUEL) 25 MG Tab Take 25 mg by mouth every evening.      divalproex (DEPAKOTE) 250 MG EC tablet Take 250 mg by mouth. 250mg evry am and 750 mg every pm       No current facility-administered medications on file prior to visit.       Medications have been reviewed and reconciled with patient at this visit.  Barriers to medications reviewed with patient.    Adverse reactions to current medications reviewed with patient..    Over the counter medications reviewed and reconciled with patient.    Exam:  Wt Readings from Last 3 Encounters:   08/23/22 59.2 kg (130 lb 8.2 oz)   03/31/22 58.4 kg (128 lb 12.8 oz)   03/04/22 57.7 kg (127 lb 3.3 oz)     Temp Readings from Last 3 Encounters:   08/23/22 97.9 °F (36.6 °C) (Temporal)   03/04/22 97.7 °F (36.5 °C) (Tympanic)     BP Readings from Last 3 Encounters:   08/23/22 126/72   03/31/22 101/65   03/04/22 116/70     Pulse Readings from Last 3 Encounters:   08/23/22 85    03/04/22 75   12/08/20 74     Body mass index is 23.12 kg/m².      Review of Systems   Respiratory: Negative for shortness of breath.    Cardiovascular: Negative for chest pain and palpitations.   Musculoskeletal: Negative for myalgias.   Endo/Heme/Allergies: Bruises/bleeds easily.   Psychiatric/Behavioral: Positive for memory loss.     Physical Exam  Vitals and nursing note reviewed.   Constitutional:       General: She is not in acute distress.     Appearance: She is well-developed. She is not diaphoretic.   HENT:      Head: Normocephalic and atraumatic.      Right Ear: External ear normal.      Left Ear: External ear normal.   Eyes:      General:         Right eye: No discharge.         Left eye: No discharge.      Conjunctiva/sclera: Conjunctivae normal.      Pupils: Pupils are equal, round, and reactive to light.   Cardiovascular:      Rate and Rhythm: Normal rate and regular rhythm.      Heart sounds: Normal heart sounds. No murmur heard.  Pulmonary:      Effort: Pulmonary effort is normal. No respiratory distress.      Breath sounds: Normal breath sounds. No wheezing.   Skin:     Findings: Bruising and ecchymosis present.      Comments: Ecchymosis on bilateral arms and upper left thigh   Neurological:      Mental Status: She is alert. Mental status is at baseline.   Psychiatric:         Cognition and Memory: Cognition is impaired. Memory is impaired.         Laboratory Reviewed ({Yes)  Lab Results   Component Value Date    WBC 3.43 (L) 03/04/2022    HGB 12.8 03/04/2022    HCT 40.8 03/04/2022     03/04/2022    CHOL 193 03/04/2022    TRIG 80 03/04/2022    HDL 54 03/04/2022    ALT 15 03/04/2022    AST 20 03/04/2022     03/04/2022    K 4.1 03/04/2022     03/04/2022    CREATININE 1.0 03/04/2022    BUN 20 03/04/2022    CO2 27 03/04/2022    TSH 2.360 03/04/2022    HGBA1C 4.9 03/04/2022       María was seen today for bleeding/bruising.    Diagnoses and all orders for this  visit:    Ecchymosis    Memory impairment  referral was placed to our neuro department by her PCP. Awaiting appt     Stage 3a chronic kidney disease  Will cont to monitor     Informed patient to call the provider that started her on Plavix to gain a better understanding of why she is on the medication. Will have medical records faxed to PCP office    Discussed home safety and using ice on bruises  No active signs of bleeding. Denies pain in abdomen. No pain noted during visit       Care Plan/Goals: Reviewed    Goals    None         Follow up: No follow-ups on file.    After visit summary was printed and given to patient upon discharge today.  Patient goals and care plan are included in After Visit Summary.

## 2022-08-23 NOTE — PATIENT INSTRUCTIONS
Call the provider who put you on Plavix and ask why was it started and how often they are going to monitor your blood work

## 2022-09-14 ENCOUNTER — NURSE TRIAGE (OUTPATIENT)
Dept: ADMINISTRATIVE | Facility: CLINIC | Age: 72
End: 2022-09-14
Payer: MEDICARE

## 2022-09-14 NOTE — TELEPHONE ENCOUNTER
Got out of bed took 2 steps and fell went about 4 feet and had to hold on to something and someone had to help her go the next 10 feet. Fell last night about midnight. And still unstable. Pt stated she feels unbalanced in her entire body. Difficulty walking. Care advice recommend pt call 911. Pt verbalized understanding.   Reason for Disposition   [1] ACUTE NEURO SYMPTOM AND [2] present now  (DEFINITION: difficult to awaken OR confused thinking and talking OR slurred speech OR weakness of arms OR unsteady walking)    Protocols used: Head Injury-A-AH

## 2022-09-19 ENCOUNTER — LAB VISIT (OUTPATIENT)
Dept: LAB | Facility: HOSPITAL | Age: 72
End: 2022-09-19
Attending: FAMILY MEDICINE
Payer: MEDICARE

## 2022-09-19 ENCOUNTER — OFFICE VISIT (OUTPATIENT)
Dept: INTERNAL MEDICINE | Facility: CLINIC | Age: 72
End: 2022-09-19
Payer: MEDICARE

## 2022-09-19 VITALS
OXYGEN SATURATION: 97 % | HEIGHT: 63 IN | DIASTOLIC BLOOD PRESSURE: 76 MMHG | BODY MASS INDEX: 23.01 KG/M2 | WEIGHT: 129.88 LBS | TEMPERATURE: 97 F | HEART RATE: 78 BPM | SYSTOLIC BLOOD PRESSURE: 110 MMHG

## 2022-09-19 DIAGNOSIS — N18.31 STAGE 3A CHRONIC KIDNEY DISEASE: ICD-10-CM

## 2022-09-19 DIAGNOSIS — E03.9 HYPOTHYROIDISM, UNSPECIFIED TYPE: ICD-10-CM

## 2022-09-19 DIAGNOSIS — N18.31 STAGE 3A CHRONIC KIDNEY DISEASE: Primary | ICD-10-CM

## 2022-09-19 LAB
ALBUMIN SERPL BCP-MCNC: 4.4 G/DL (ref 3.5–5.2)
ALP SERPL-CCNC: 44 U/L (ref 55–135)
ALT SERPL W/O P-5'-P-CCNC: 16 U/L (ref 10–44)
ANION GAP SERPL CALC-SCNC: 6 MMOL/L (ref 8–16)
AST SERPL-CCNC: 20 U/L (ref 10–40)
BILIRUB SERPL-MCNC: 0.5 MG/DL (ref 0.1–1)
BUN SERPL-MCNC: 17 MG/DL (ref 8–23)
CALCIUM SERPL-MCNC: 10.6 MG/DL (ref 8.7–10.5)
CHLORIDE SERPL-SCNC: 104 MMOL/L (ref 95–110)
CO2 SERPL-SCNC: 30 MMOL/L (ref 23–29)
CREAT SERPL-MCNC: 1.2 MG/DL (ref 0.5–1.4)
EST. GFR  (NO RACE VARIABLE): 47.8 ML/MIN/1.73 M^2
GLUCOSE SERPL-MCNC: 88 MG/DL (ref 70–110)
POTASSIUM SERPL-SCNC: 4.2 MMOL/L (ref 3.5–5.1)
PROT SERPL-MCNC: 6.7 G/DL (ref 6–8.4)
SODIUM SERPL-SCNC: 140 MMOL/L (ref 136–145)
TSH SERPL DL<=0.005 MIU/L-ACNC: 1.89 UIU/ML (ref 0.4–4)

## 2022-09-19 PROCEDURE — 99999 PR PBB SHADOW E&M-EST. PATIENT-LVL III: CPT | Mod: PBBFAC,,, | Performed by: FAMILY MEDICINE

## 2022-09-19 PROCEDURE — 36415 COLL VENOUS BLD VENIPUNCTURE: CPT | Performed by: FAMILY MEDICINE

## 2022-09-19 PROCEDURE — 90714 TD VACC NO PRESV 7 YRS+ IM: CPT | Mod: PBBFAC

## 2022-09-19 PROCEDURE — 99999 PR PBB SHADOW E&M-EST. PATIENT-LVL III: ICD-10-PCS | Mod: PBBFAC,,, | Performed by: FAMILY MEDICINE

## 2022-09-19 PROCEDURE — 99213 OFFICE O/P EST LOW 20 MIN: CPT | Mod: PBBFAC,59 | Performed by: FAMILY MEDICINE

## 2022-09-19 PROCEDURE — 84443 ASSAY THYROID STIM HORMONE: CPT | Performed by: FAMILY MEDICINE

## 2022-09-19 PROCEDURE — 80053 COMPREHEN METABOLIC PANEL: CPT | Performed by: FAMILY MEDICINE

## 2022-09-19 PROCEDURE — 99214 PR OFFICE/OUTPT VISIT, EST, LEVL IV, 30-39 MIN: ICD-10-PCS | Mod: S$PBB,,, | Performed by: FAMILY MEDICINE

## 2022-09-19 PROCEDURE — 99214 OFFICE O/P EST MOD 30 MIN: CPT | Mod: S$PBB,,, | Performed by: FAMILY MEDICINE

## 2022-09-19 RX ORDER — ASPIRIN 81 MG/1
81 TABLET ORAL DAILY
COMMUNITY

## 2022-09-19 NOTE — PROGRESS NOTES
Subjective:       Patient ID: María Henson is a 72 y.o. female.    Chief Complaint: Follow-up    HPI    Patient Active Problem List   Diagnosis    CKD (chronic kidney disease) stage 3, GFR 30-59 ml/min    Bipolar 1 disorder    Hypothyroidism       Past Medical History:   Diagnosis Date    Bipolar 1 disorder     Hypothyroidism     Osteopenia        Past Surgical History:   Procedure Laterality Date    CYST REMOVAL      TONSILLECTOMY      WISDOM TOOTH EXTRACTION         Family History   Problem Relation Age of Onset    Heart failure Mother     Heart failure Brother     Diabetes Brother     Cancer Brother     Breast cancer Maternal Aunt        Social History     Tobacco Use   Smoking Status Former   Smokeless Tobacco Never       Wt Readings from Last 5 Encounters:   09/19/22 58.9 kg (129 lb 13.6 oz)   08/23/22 59.2 kg (130 lb 8.2 oz)   03/31/22 58.4 kg (128 lb 12.8 oz)   03/04/22 57.7 kg (127 lb 3.3 oz)   09/06/17 59.5 kg (131 lb 2.8 oz)       For further HPI details, see assessment and plan.    Review of Systems    Objective:      Vitals:    09/19/22 1010   BP: 110/76   Pulse: 78   Temp: 96.8 °F (36 °C)       Physical Exam  Constitutional:       General: She is not in acute distress.     Appearance: She is not ill-appearing.   Pulmonary:      Effort: Pulmonary effort is normal. No respiratory distress.   Neurological:      General: No focal deficit present.      Mental Status: She is alert.   Psychiatric:         Mood and Affect: Mood normal.         Behavior: Behavior normal.       Assessment:       1. Stage 3a chronic kidney disease    2. Hypothyroidism, unspecified type        Plan:   Stage 3a chronic kidney disease  -     COMPREHENSIVE METABOLIC PANEL; Future; Expected date: 09/19/2022    Hypothyroidism, unspecified type  -     TSH; Future; Expected date: 09/19/2022    Other orders  -     (In Office Administered) Td Vaccine - Preservative Free      She was started on plavix by neurologist -  She is not sure  why  The only reason she started the med was she went to pharmacy and it in the bag she picked up.  She called that office -  She took herself off of plavix  She is taking asa 81 mg  Brusing resolved  Encouraged she discuss w prescribing md      Hypothryoidism  Lab Results   Component Value Date    TSH 1.892 09/19/2022   Continue cytomel.  Will check  Will refill if TSH wnl  She was on synthroid - a previous MD had switched to cytomel - for augmentation of depression. I was not aware of this effect but upon review noted on dosing of up to date drug information. As long as her TSH stays appropriate I am ok with its use.        CKD IIIa  56.4 GFR in may  Will check  Avoid nsaids    She tells me sleep apnea test is still pending.        Declines flu and shingles shot  She is interested in td shot.

## 2022-09-20 DIAGNOSIS — E83.52 HYPERCALCEMIA: Primary | ICD-10-CM

## 2022-09-28 ENCOUNTER — TELEPHONE (OUTPATIENT)
Dept: NEUROLOGY | Facility: CLINIC | Age: 72
End: 2022-09-28
Payer: MEDICARE

## 2022-09-28 NOTE — TELEPHONE ENCOUNTER
----- Message from Divina Neal sent at 9/28/2022  2:44 PM CDT -----  Contact: [phone] Patient  Pt is requesting a call back about her coming in early for her 09/29 appt.

## 2022-09-29 ENCOUNTER — OFFICE VISIT (OUTPATIENT)
Dept: NEUROLOGY | Facility: CLINIC | Age: 72
End: 2022-09-29
Payer: MEDICARE

## 2022-09-29 VITALS
BODY MASS INDEX: 23.01 KG/M2 | SYSTOLIC BLOOD PRESSURE: 128 MMHG | HEART RATE: 72 BPM | RESPIRATION RATE: 16 BRPM | WEIGHT: 129.88 LBS | DIASTOLIC BLOOD PRESSURE: 76 MMHG | HEIGHT: 63 IN | OXYGEN SATURATION: 98 %

## 2022-09-29 DIAGNOSIS — R41.3 MEMORY IMPAIRMENT: ICD-10-CM

## 2022-09-29 DIAGNOSIS — R41.9 COGNITIVE COMPLAINTS WITH NORMAL EXAM: Primary | ICD-10-CM

## 2022-09-29 DIAGNOSIS — F31.9 BIPOLAR 1 DISORDER: ICD-10-CM

## 2022-09-29 DIAGNOSIS — E03.9 HYPOTHYROIDISM, UNSPECIFIED TYPE: ICD-10-CM

## 2022-09-29 DIAGNOSIS — R26.81 UNSTEADY GAIT WHEN WALKING: ICD-10-CM

## 2022-09-29 DIAGNOSIS — Z79.899 LITHIUM USE: ICD-10-CM

## 2022-09-29 PROCEDURE — 99417 PR PROLONGED SVC, OUTPT, W/WO DIRECT PT CONTACT,  EA ADDTL 15 MIN: ICD-10-PCS | Mod: S$PBB,,, | Performed by: NURSE PRACTITIONER

## 2022-09-29 PROCEDURE — 99215 PR OFFICE/OUTPT VISIT, EST, LEVL V, 40-54 MIN: ICD-10-PCS | Mod: FS,S$PBB,, | Performed by: NURSE PRACTITIONER

## 2022-09-29 PROCEDURE — 99215 OFFICE O/P EST HI 40 MIN: CPT | Mod: PBBFAC | Performed by: NURSE PRACTITIONER

## 2022-09-29 PROCEDURE — 99999 PR PBB SHADOW E&M-EST. PATIENT-LVL V: ICD-10-PCS | Mod: PBBFAC,,, | Performed by: NURSE PRACTITIONER

## 2022-09-29 PROCEDURE — 99999 PR PBB SHADOW E&M-EST. PATIENT-LVL V: CPT | Mod: PBBFAC,,, | Performed by: NURSE PRACTITIONER

## 2022-09-29 PROCEDURE — 99215 OFFICE O/P EST HI 40 MIN: CPT | Mod: FS,S$PBB,, | Performed by: NURSE PRACTITIONER

## 2022-09-29 PROCEDURE — 99417 PROLNG OP E/M EACH 15 MIN: CPT | Mod: S$PBB,,, | Performed by: NURSE PRACTITIONER

## 2022-09-29 NOTE — PROGRESS NOTES
Subjective:       Patient ID: María Henson is a 72 y.o. female.    Chief Complaint: Memory Issues       Referred by PCP, Dr. Mami MD    HPIThe patient is new to me,  here for memory loss evaluation. The patient is presenting with memory changes in 2022. Patient is un accompanied. The patient first reported she noticed some  handwriting changes, she would go to write a word but would write incorrectly. The patient significant other would constantly remind her of things discussed. The main problems the patient has are related to short term memory loss. For example, the patient would repeat the same question over and over again. The patient denies misplacing things. The patient is driving only locally and she states her partner is not comfortable driving further.  The patient is not losing personal items and does not put them in odd places. No confusion around and inside the house. Occasionally trouble remembering the date and time. The patient is no longer managing medications, her spouse supervise medications, she has made errors, not taking the medications correctly and not preparing them correctly to take as directed. Patient manage her own appointments but spouse reminds her. The patient is not able recall  major holidays and unable to recall political changes. The patient is independent in handling finances, she has overpaid  $2,000 on a bill. The patient is still independent with ADLs. No hallucinations or delusions. No seizures. No behavioral problems. No language problems. No problems handling tools. No history of strokes. No history of headaches. History of hypothyroidism takes Cytomel. No history of alcoholism. No history of B12 deficiency. Bipoloar Manic depression Takes Catalyisa, Lithihum, Cympbalta, and Klonopin.  Patient sees a psychosis. Unable to recall last Lithium level. No history of STDs.  No history of HIV infection. No toxic exposures.  No history of traumatic brain injury. Chronic  bilateral  tremors. No falls or instability. No urinary incontinence. No change in sleep and appetite. Mother and Maternal Aunt had late AD dementia. Patient had recent work up at [facility] [place] [facility] [facility] request memory FELIZ results. Patient has a pending order for sleep study to evaluate sleep apnea.      Review of Systems   Constitutional:  Negative for activity change, appetite change, chills, diaphoresis and fatigue.   HENT:  Negative for hearing loss and tinnitus.    Eyes:  Negative for photophobia and visual disturbance.   Respiratory:  Negative for apnea and shortness of breath.    Cardiovascular:  Negative for chest pain and palpitations.   Gastrointestinal:  Negative for nausea and vomiting.   Endocrine: Negative for cold intolerance and heat intolerance.   Genitourinary:  Negative for difficulty urinating and urgency.   Musculoskeletal:  Positive for arthralgias and myalgias. Negative for back pain, gait problem, joint swelling, neck pain and neck stiffness.   Skin:  Negative for color change and rash.   Allergic/Immunologic: Negative for environmental allergies and immunocompromised state.   Neurological:  Positive for tremors. Negative for dizziness, seizures, syncope, facial asymmetry, speech difficulty, weakness, light-headedness, numbness and headaches.   Hematological:  Negative for adenopathy. Does not bruise/bleed easily.   Psychiatric/Behavioral:  Positive for decreased concentration and dysphoric mood. Negative for agitation, behavioral problems, confusion, hallucinations, self-injury, sleep disturbance and suicidal ideas. The patient is nervous/anxious. The patient is not hyperactive.                Current Outpatient Medications:     ascorbic acid (CYTO C ORAL), Take by mouth., Disp: , Rfl:     aspirin (ECOTRIN) 81 MG EC tablet, Take 81 mg by mouth once daily., Disp: , Rfl:     azelastine 0.15 % (205.5 mcg) Spry, 1 spray by Nasal route 2 (two)  times daily as needed. , Disp: , Rfl:     clonazepam (KLONOPIN) 0.5 MG tablet, Take 1.5 mg by mouth daily as needed., Disp: , Rfl:     DULoxetine (CYMBALTA) 60 MG capsule, Take 60 mg by mouth once daily., Disp: , Rfl:     fluticasone propionate (FLONASE) 50 mcg/actuation nasal spray, SPRAY 1 SPRAY PER NOSTRIL TWICE DAILY., Disp: , Rfl:     lamotrigine (LAMICTAL) 100 MG tablet, Take 200 mg by mouth 2 (two) times daily. Take one tablet in the morning and 1.5 tablet in the evening by mouth daily., Disp: , Rfl:     liothyronine (CYTOMEL) 5 MCG Tab, TAKE 1 TABLET IN THE MORNING AND 1 TABLET IN THE AFTERNOON ON AN EMPTY STOMACH AS DIRECTED, Disp: , Rfl:     lithium (ESKALITH) 450 MG TbSR, Take 450 mg by mouth once daily., Disp: , Rfl:     lumateperone (CAPLYTA) 42 mg Cap, Take by mouth., Disp: , Rfl:   Past Medical History:   Diagnosis Date    Bipolar 1 disorder     Hypothyroidism     Osteopenia      Past Surgical History:   Procedure Laterality Date    CYST REMOVAL      TONSILLECTOMY      WISDOM TOOTH EXTRACTION       Social History     Socioeconomic History    Marital status:    Tobacco Use    Smoking status: Former    Smokeless tobacco: Never   Substance and Sexual Activity    Alcohol use: No    Drug use: No    Sexual activity: Not Currently             Past/Current Medical/Surgical History, Past/Current Social History, Past/Current Family History and Past/Current Medications were reviewed in detail.        Objective:           VITAL SIGNS WERE REVIEWED      GENERAL APPEARANCE:     The patient looks comfortable.    BMI 23.0 KG     No signs of respiratory distress.    Normal breathing pattern.    No dysmorphic features    Normal eye contact.     GENERAL MEDICAL EXAM:    HEENT:  Head is atraumatic normocephalic.     No tender temporal arteries. Fundoscopic (Ophthalmoscopic) exam showed no disc edema.      Neck and Axillae: No JVD. No visible lesions.    No carotid bruits. No thyromegaly. No  lymphadenopathy.    Cardiopulmonary: No cyanosis. No tachypnea. Normal respiratory effort.    Gastrointestinal/Urogenital:  No jaundice. No stomas or lesions. No visible hernias. No catheters.     Abdomen is soft non-tender. No masses or organomegaly.    Skin, Hair and Nails: No pathognonomic skin rash. No neurofibromatosis. No visible lesions.No stigmata of autoimmune disease. No clubbing.    Skin is warm and moist. No palpable masses.    Limbs: No varicose veins. No visible swelling.    No palpable edema. Pulses are symmetric. Pedal pulses are palpable.      Muskoskeletal: OA visible deformities.No visible lesions.    No spine tenderness. No signs of longstanding neuropathy. No dislocations or fractures.            Neurologic Exam     Mental Status   Oriented to person, place, and time.   Registration: recalls 3 of 3 objects. Recall at 5 minutes: recalls 3 of 3 objects. Follows 3 step commands.   Attention: normal. Concentration: normal.   Speech: speech is normal   Level of consciousness: alert  Knowledge: good and consistent with education. Able to perform simple calculations.   Able to name object. Able to read. Able to repeat. Able to write. Normal comprehension.     MOCA 30/30    Visuospatial/Executive 5/5    Naming                            3/3    Attention                         6/6    Language                         3/3    Abstraction                    2/2    Recall                                5/5    Orientation                     6/6      NORMAL-MILD NCD 26-30    [employer]      Cranial Nerves     CN II   Visual fields full to confrontation.   Visual acuity: normal  Right visual field deficit: none  Left visual field deficit: none     CN III, IV, VI   Pupils are equal, round, and reactive to light.  Extraocular motions are normal.   Right pupil: Size: 2 mm. Shape: regular. Reactivity: brisk. Consensual response: intact. Accommodation: intact.   Left pupil: Size: 2 mm. Shape: regular. Reactivity:  brisk. Consensual response: intact. Accommodation: intact.   CN III: no CN III palsy  CN VI: no CN VI palsy  Nystagmus: none   Diplopia: none  Ophthalmoparesis: none  Upgaze: normal  Downgaze: normal  Conjugate gaze: present  Vestibulo-ocular reflex: present    CN V   Facial sensation intact.   Right facial sensation deficit: none  Left facial sensation deficit: none  Right corneal reflex: normal  Left corneal reflex: normal    CN VII   Right facial weakness: none  Left facial weakness: none  Right taste: normal  Left taste: normal    CN VIII   CN VIII normal.   Hearing: intact  Right Rinne: AC > BC  Left Rinne: AC > BC  Santamaria: does not lateralize     CN IX, X   CN IX normal.   CN X normal.   Palate: symmetric  Right gag reflex: normal  Left gag reflex: normal    CN XI   CN XI normal.   Right sternocleidomastoid strength: normal  Left sternocleidomastoid strength: normal  Right trapezius strength: normal  Left trapezius strength: normal    CN XII   CN XII normal.   Tongue: not atrophic  Fasciculations: absent  Tongue deviation: none    Motor Exam   Muscle bulk: normal  Overall muscle tone: normal  Right arm tone: normal  Left arm tone: normal  Right arm pronator drift: absent  Left arm pronator drift: absent  Right leg tone: normal  Left leg tone: normal    Strength   Strength 5/5 throughout.   Right neck flexion: 5/5  Left neck flexion: 5/5  Right neck extension: 5/5  Left neck extension: 5/5  Right deltoid: 5/5  Left deltoid: 5/5  Right biceps: 5/5  Left biceps: 5/5  Right triceps: 5/5  Left triceps: 5/5  Right wrist flexion: 5/5  Left wrist flexion: 5/5  Right wrist extension: 5/5  Left wrist extension: 5/5  Right interossei: 5/5  Left interossei: 5/5  Right abdominals: 5/5  Left abdominals: 5/5  Right iliopsoas: 5/5  Left iliopsoas: 5/5  Right quadriceps: 5/5  Left quadriceps: 5/5  Right hamstrin/5  Left hamstrin/5  Right glutei: 5/5  Left glutei: 5/5  Right anterior tibial: 5/5  Left anterior tibial:  5/5  Right posterior tibial: 5/5  Left posterior tibial: 5/5  Right peroneal: 5/5  Left peroneal: 5/5  Right gastroc: 5/5  Left gastroc: 5/5    Sensory Exam   Light touch normal.   Right arm light touch: normal  Left arm light touch: normal  Right leg light touch: normal  Left leg light touch: normal  Vibration normal.   Right arm vibration: normal  Left arm vibration: normal  Right leg vibration: normal  Left leg vibration: normal  Proprioception normal.   Right arm proprioception: normal  Left arm proprioception: normal  Right leg proprioception: normal  Left leg proprioception: normal  Pinprick normal.   Right arm pinprick: normal  Left arm pinprick: normal  Right leg pinprick: normal  Left leg pinprick: normal  Sensory deficit distribution on left: lateral cutaneous thigh  Graphesthesia: normal  Stereognosis: normal    Gait, Coordination, and Reflexes     Gait  Gait: normal    Coordination   Romberg: negative  Finger to nose coordination: normal  Heel to shin coordination: normal  Tandem walking coordination: normal    Tremor   Resting tremor: absent  Action tremor: right arm    Reflexes   Right brachioradialis: 2+  Left brachioradialis: 2+  Right biceps: 2+  Left biceps: 2+  Right triceps: 2+  Left triceps: 2+  Right patellar: 2+  Left patellar: 2+  Right achilles: 2+  Left achilles: 2+  Right : 2+  Left : 2+  Right plantar: normal  Left plantar: normal  Right Chatterjee: absent  Left Chatterjee: absent  Right ankle clonus: absent  Left ankle clonus: absent  Right pendular knee jerk: absent  Left pendular knee jerk: absent        Postural tremor bilaterally      Lab Results   Component Value Date    WBC 3.43 (L) 03/04/2022    HGB 12.8 03/04/2022    HCT 40.8 03/04/2022    MCV 96 03/04/2022     03/04/2022     Sodium   Date Value Ref Range Status   09/19/2022 140 136 - 145 mmol/L Final     Potassium   Date Value Ref Range Status   09/19/2022 4.2 3.5 - 5.1 mmol/L Final     Chloride   Date Value Ref Range  Status   09/19/2022 104 95 - 110 mmol/L Final     CO2   Date Value Ref Range Status   09/19/2022 30 (H) 23 - 29 mmol/L Final     Glucose   Date Value Ref Range Status   09/19/2022 88 70 - 110 mg/dL Final     BUN   Date Value Ref Range Status   09/19/2022 17 8 - 23 mg/dL Final     Creatinine   Date Value Ref Range Status   09/19/2022 1.2 0.5 - 1.4 mg/dL Final     Calcium   Date Value Ref Range Status   09/19/2022 10.6 (H) 8.7 - 10.5 mg/dL Final     Total Protein   Date Value Ref Range Status   09/19/2022 6.7 6.0 - 8.4 g/dL Final     Albumin   Date Value Ref Range Status   09/19/2022 4.4 3.5 - 5.2 g/dL Final     Total Bilirubin   Date Value Ref Range Status   09/19/2022 0.5 0.1 - 1.0 mg/dL Final     Comment:     For infants and newborns, interpretation of results should be based  on gestational age, weight and in agreement with clinical  observations.    Premature Infant recommended reference ranges:  Up to 24 hours.............<8.0 mg/dL  Up to 48 hours............<12.0 mg/dL  3-5 days..................<15.0 mg/dL  6-29 days.................<15.0 mg/dL       Alkaline Phosphatase   Date Value Ref Range Status   09/19/2022 44 (L) 55 - 135 U/L Final     AST   Date Value Ref Range Status   09/19/2022 20 10 - 40 U/L Final     ALT   Date Value Ref Range Status   09/19/2022 16 10 - 44 U/L Final     Anion Gap   Date Value Ref Range Status   09/19/2022 6 (L) 8 - 16 mmol/L Final     eGFR if    Date Value Ref Range Status   03/04/2022 >60.0 >60 mL/min/1.73 m^2 Final     eGFR if non    Date Value Ref Range Status   03/04/2022 56.4 (A) >60 mL/min/1.73 m^2 Final     Comment:     Calculation used to obtain the estimated glomerular filtration  rate (eGFR) is the CKD-EPI equation.        Lab Results   Component Value Date    JGDYECCY18 402 03/04/2022     Lab Results   Component Value Date    TSH 1.892 09/19/2022   Labs reviewed:    09-    Lithium Level 1.2 NL ( 0.6 -1.2 )     UA Normal no UTI  noted    CMP WNL     No results found in the last 24 hours.      Reviewed the neuroimaging independently       Assessment:       1. Cognitive complaints with normal exam    2. Memory impairment    3. Bipolar 1 disorder    4. Lithium use    5. Unsteady gait when walking    6. Hypothyroidism, unspecified type        Plan:         COGNITIVE COMPLAINTS WITH NORMAL EXAM/ FORGETFULNESS/ BIPOLAR MANIC/ LITHIUM USE/ UNSTEADY GAIT/ HYPOTHYROIDISM BIPOLAR/ASHKAN MDD      EVALUATION     Request Brain MRI to evaluate the frontal and temporal lobes.    Comprehensive Neuropsychological Testing with Dr Cloud    Check Lithium Level and UA     Explained to the patient and family that medications are intended to slow down the progression and not stop it or reverse the disease.The disease is relentless, progressive and so far cannot be controlled.     I counseled the patient and family that the rate of progression is extremely variable and the average (10 years) is an inaccurate measure.     NO DMA recommended at this time    Recommend optimization of MDD/ASHKAN/Bipolar     Follow up with Psychiatrists for management      HOME CARE     Falling Down Precautions. Gait is affected by the disease as well.     Avoid driving and access to firearms     Incremental 24/Care     Help with finances and decision making.    Join support group.    Proofing the house and use labeling.    Avoid antihistamines and anticholinergics.    Avoid changing routine.    Use written reminders.    Avoid multitasking.    Healthy diet, exercise (physical and cognitive).    Good sleep hygiene.        TREMOR OF NERVOUS SYSTEM LIKELY MEDICATION INDUCED     [place] [facility] [place]     Avoid stimulants like caffeine, nicotineetc.    Cut down steroids if possible    Check Lithium level           MEDICAL/SURGICAL COMORBIDITIES     All relevant medical comorbidities noted and managed by primary care physician and medical care team.          MISCELLANEOUS MEDICAL PROBLEMS        HEALTHY LIFESTYLE AND PREVENTATIVE CARE    Encouraged the patient to adhere to the age-appropriate health maintenance guidelines including screening tests and vaccinations.     Discussed the overall importance of healthy lifestyle, optimal weight, exercise, healthy diet, good sleep hygiene and avoiding drugs including smoking, alcohol and recreational drugs. The patient verbalized full understanding.       Advised the patient to follow COVID-19 prevention measures.       I spent 120 total time more that 50 % spent face to face with the patient    Time spent in counseling and coordination of care including discussions etiology of diagnosis, pathogenesis of diagnosis, prognosis of diagnosis,, diagnostic results, impression and recommendations, diagnostic studies, management, risks and benefits of treatment, instructions of disease self-management, treatment instructions, follow up requirements, patient and family counseling/involvement in care compliance with treatment regimen. All of the patient's questions were answered during this discussion.           Nay Cardenas, MSN NP      Collaborating Provider: Michael Franco MD, FAAN Neurologist/Epileptologist

## 2022-10-05 RX ORDER — LIOTHYRONINE SODIUM 5 UG/1
TABLET ORAL
Qty: 90 TABLET | Refills: 1 | Status: SHIPPED | OUTPATIENT
Start: 2022-10-05 | End: 2022-11-03

## 2022-10-12 DIAGNOSIS — E83.52 HYPERCALCEMIA: Primary | ICD-10-CM

## 2022-10-26 ENCOUNTER — TELEPHONE (OUTPATIENT)
Dept: INTERNAL MEDICINE | Facility: CLINIC | Age: 72
End: 2022-10-26
Payer: MEDICARE

## 2022-10-31 ENCOUNTER — TELEPHONE (OUTPATIENT)
Dept: INTERNAL MEDICINE | Facility: CLINIC | Age: 72
End: 2022-10-31
Payer: MEDICARE

## 2022-10-31 NOTE — TELEPHONE ENCOUNTER
----- Message from Usha Greene sent at 10/31/2022 12:17 PM CDT -----  Contact: Pt Mobile [phone]  Patient would like a call back in regards to her wanting to know if you have received her medical records from [facility]?

## 2022-11-01 ENCOUNTER — LAB VISIT (OUTPATIENT)
Dept: LAB | Facility: HOSPITAL | Age: 72
End: 2022-11-01
Attending: FAMILY MEDICINE
Payer: MEDICARE

## 2022-11-01 DIAGNOSIS — E83.52 HYPERCALCEMIA: ICD-10-CM

## 2022-11-01 PROCEDURE — 83970 ASSAY OF PARATHORMONE: CPT | Performed by: FAMILY MEDICINE

## 2022-11-01 PROCEDURE — 80053 COMPREHEN METABOLIC PANEL: CPT | Performed by: FAMILY MEDICINE

## 2022-11-01 PROCEDURE — 36415 COLL VENOUS BLD VENIPUNCTURE: CPT | Mod: PN | Performed by: FAMILY MEDICINE

## 2022-11-02 ENCOUNTER — TELEPHONE (OUTPATIENT)
Dept: INTERNAL MEDICINE | Facility: CLINIC | Age: 72
End: 2022-11-02
Payer: MEDICARE

## 2022-11-02 LAB
ALBUMIN SERPL BCP-MCNC: 3.9 G/DL (ref 3.5–5.2)
ALP SERPL-CCNC: 45 U/L (ref 55–135)
ALT SERPL W/O P-5'-P-CCNC: 17 U/L (ref 10–44)
ANION GAP SERPL CALC-SCNC: 9 MMOL/L (ref 8–16)
AST SERPL-CCNC: 18 U/L (ref 10–40)
BILIRUB SERPL-MCNC: 0.3 MG/DL (ref 0.1–1)
BUN SERPL-MCNC: 21 MG/DL (ref 8–23)
CALCIUM SERPL-MCNC: 9.9 MG/DL (ref 8.7–10.5)
CHLORIDE SERPL-SCNC: 107 MMOL/L (ref 95–110)
CO2 SERPL-SCNC: 25 MMOL/L (ref 23–29)
CREAT SERPL-MCNC: 1.1 MG/DL (ref 0.5–1.4)
EST. GFR  (NO RACE VARIABLE): 53.1 ML/MIN/1.73 M^2
GLUCOSE SERPL-MCNC: 90 MG/DL (ref 70–110)
POTASSIUM SERPL-SCNC: 4.4 MMOL/L (ref 3.5–5.1)
PROT SERPL-MCNC: 6.4 G/DL (ref 6–8.4)
PTH-INTACT SERPL-MCNC: 78.9 PG/ML (ref 9–77)
SODIUM SERPL-SCNC: 141 MMOL/L (ref 136–145)

## 2022-11-02 NOTE — TELEPHONE ENCOUNTER
----- Message from Shoaib Nava sent at 11/2/2022  9:45 AM CDT -----  Contact: Patient  María Henson would like a call back at [phone], in regards to if a request can be [facility] requesting [facility] records.

## 2022-11-02 NOTE — TELEPHONE ENCOUNTER
----- Message from Fe Lindsey sent at 11/2/2022 12:01 PM CDT -----  Contact: [phone]  Type:  Patient Returning Call    Who Called:María  Who Left Message for Patient:Joe Olivares  Does the patient know what this is regarding?:[facility] Record Request  Would the patient rather a call back or a response via [facility]? Call back  Best Call Back Number:[phone]  Additional Information:

## 2023-03-28 ENCOUNTER — TELEPHONE (OUTPATIENT)
Dept: INTERNAL MEDICINE | Facility: CLINIC | Age: 73
End: 2023-03-28
Payer: MEDICARE

## 2023-03-28 NOTE — TELEPHONE ENCOUNTER
----- Message from Robin Smalls sent at 3/28/2023  3:24 PM CDT -----  Who Called: patient          What is the reqeust in detail: Requesting call back to discuss if pt should get appointment after her test results are all in.          Can the clinic reply by [facility]? no         Best Call Back Number: [phone]           Additional Information:

## 2023-04-04 ENCOUNTER — TELEPHONE (OUTPATIENT)
Dept: INTERNAL MEDICINE | Facility: CLINIC | Age: 73
End: 2023-04-04
Payer: MEDICARE

## 2023-04-04 NOTE — TELEPHONE ENCOUNTER
----- Message from Shoaib Nava sent at 4/4/2023 11:26 AM CDT -----  Contact: Patient  María Henson would like a call back at [phone], in regards to copies of her test that she is wanting the doctor to review.

## 2023-04-04 NOTE — TELEPHONE ENCOUNTER
Patient have copy of her notes and results neuro visit. Asking how Dr. Bolaños can review it on her visit. Informed to drop it off and will let her know if needed a visit or not. Patient verbally agreed.

## 2023-04-21 ENCOUNTER — TELEPHONE (OUTPATIENT)
Dept: INTERNAL MEDICINE | Facility: CLINIC | Age: 73
End: 2023-04-21
Payer: MEDICARE

## 2023-04-21 NOTE — TELEPHONE ENCOUNTER
Contacted patient to inform her that she can have her splinter removed. Scheduled appointment, patient informed of date and time of appointment patient verbalized understanding.

## 2023-04-21 NOTE — TELEPHONE ENCOUNTER
----- Message from Price Stallings sent at 4/21/2023  8:55 AM CDT -----  Contact: [phone]  Patient requesting a call back at [phone] in regards to a splinter in her right hand. She would like to know if this is something the provider can remove or if she will need to be seen elsewhere. Thanks KD

## 2023-04-21 NOTE — TELEPHONE ENCOUNTER
Mds/APPs are capable of foreign body removal. This problem is usually handled in primary care (if booking, please book a 40 minutes slot to allow for time to assess and perform procedure) or in the urgent care clinics.

## 2023-04-21 NOTE — TELEPHONE ENCOUNTER
----- Message from Zahraa Palacios sent at 4/21/2023 11:51 AM CDT -----  Who Called: Pt    What is the request in detail: Requesting call back to discuss if PA can surgery on fingernail. Please advise    Can the clinic reply by [facility]? No    Best Call Back Number: [phone]      Additional Information:

## 2023-04-21 NOTE — TELEPHONE ENCOUNTER
Patient wants to know can a PA or NP can perform a surgery on her finger nail. States that she has a splinter that's about 1 inch that she cant get to .

## 2023-04-24 ENCOUNTER — OFFICE VISIT (OUTPATIENT)
Dept: INTERNAL MEDICINE | Facility: CLINIC | Age: 73
End: 2023-04-24
Payer: MEDICARE

## 2023-04-24 VITALS
TEMPERATURE: 99 F | WEIGHT: 121.5 LBS | BODY MASS INDEX: 21.53 KG/M2 | OXYGEN SATURATION: 96 % | HEIGHT: 63 IN | SYSTOLIC BLOOD PRESSURE: 116 MMHG | DIASTOLIC BLOOD PRESSURE: 62 MMHG

## 2023-04-24 DIAGNOSIS — S60.459A SUBUNGUAL FOREIGN BODY OF FINGER, INITIAL ENCOUNTER: Primary | ICD-10-CM

## 2023-04-24 DIAGNOSIS — F31.9 BIPOLAR 1 DISORDER: ICD-10-CM

## 2023-04-24 DIAGNOSIS — N18.31 STAGE 3A CHRONIC KIDNEY DISEASE: ICD-10-CM

## 2023-04-24 PROCEDURE — 99213 PR OFFICE/OUTPT VISIT, EST, LEVL III, 20-29 MIN: ICD-10-PCS | Mod: S$PBB,,, | Performed by: PHYSICIAN ASSISTANT

## 2023-04-24 PROCEDURE — 99999 PR PBB SHADOW E&M-EST. PATIENT-LVL III: CPT | Mod: PBBFAC,,, | Performed by: PHYSICIAN ASSISTANT

## 2023-04-24 PROCEDURE — 99213 OFFICE O/P EST LOW 20 MIN: CPT | Mod: S$PBB,,, | Performed by: PHYSICIAN ASSISTANT

## 2023-04-24 PROCEDURE — 99999 PR PBB SHADOW E&M-EST. PATIENT-LVL III: ICD-10-PCS | Mod: PBBFAC,,, | Performed by: PHYSICIAN ASSISTANT

## 2023-04-24 PROCEDURE — 99213 OFFICE O/P EST LOW 20 MIN: CPT | Mod: PBBFAC | Performed by: PHYSICIAN ASSISTANT

## 2023-04-24 RX ORDER — QUETIAPINE FUMARATE 100 MG/1
TABLET, FILM COATED ORAL
COMMUNITY
Start: 2023-04-10

## 2023-04-25 NOTE — ASSESSMENT & PLAN NOTE
Lab Results   Component Value Date    BUN 21 11/01/2022    CREATININE 1.1 11/01/2022    ESTGFRAFRICA >60.0 03/04/2022    EGFRNONAA 56.4 (A) 03/04/2022    EGFRNORACEVR 53.1 (A) 11/01/2022    Stable

## 2023-04-25 NOTE — PROGRESS NOTES
Subjective:       Patient ID: María Henson is a 73 y.o. female.    Chief Complaint: Nail Problem (Splinter under finger nail)      María Henson is a 73 y.o. female who presents today with complaints of foreign body (splinter) under her nail for a few days. TDAP is UTD. No swelling or drainage, no pain.       Review of Systems   Constitutional:  Negative for chills and fever.   Skin:  Positive for wound. Negative for rash.     Objective:      Physical Exam  Vitals and nursing note reviewed.   Constitutional:       General: She is not in acute distress.     Appearance: She is well-developed.   HENT:      Head: Normocephalic and atraumatic.   Eyes:      General: Lids are normal. No scleral icterus.     Extraocular Movements: Extraocular movements intact.      Conjunctiva/sclera: Conjunctivae normal.   Pulmonary:      Effort: Pulmonary effort is normal.   Musculoskeletal:        Hands:    Neurological:      Mental Status: She is alert.      Cranial Nerves: No cranial nerve deficit.   Psychiatric:         Mood and Affect: Mood and affect normal.       Assessment:       1. Subungual foreign body of finger, initial encounter    2. Bipolar 1 disorder    3. Stage 3a chronic kidney disease        Plan:   1. Subungual foreign body of finger, initial encounter    2. Bipolar 1 disorder  Overview:  Followed by Psychiatry, continue current treatment plan       3. Stage 3a chronic kidney disease  Assessment & Plan:  Lab Results   Component Value Date    BUN 21 11/01/2022    CREATININE 1.1 11/01/2022    ESTGFRAFRICA >60.0 03/04/2022    EGFRNONAA 56.4 (A) 03/04/2022    EGFRNORACEVR 53.1 (A) 11/01/2022    Stable        Wound care instructions given.

## 2023-05-23 ENCOUNTER — LAB VISIT (OUTPATIENT)
Dept: LAB | Facility: HOSPITAL | Age: 73
End: 2023-05-23
Attending: FAMILY MEDICINE
Payer: MEDICARE

## 2023-05-23 ENCOUNTER — OFFICE VISIT (OUTPATIENT)
Dept: INTERNAL MEDICINE | Facility: CLINIC | Age: 73
End: 2023-05-23
Payer: MEDICARE

## 2023-05-23 VITALS
HEIGHT: 63 IN | SYSTOLIC BLOOD PRESSURE: 102 MMHG | DIASTOLIC BLOOD PRESSURE: 60 MMHG | OXYGEN SATURATION: 98 % | TEMPERATURE: 97 F | BODY MASS INDEX: 21.71 KG/M2 | HEART RATE: 73 BPM | WEIGHT: 122.56 LBS

## 2023-05-23 DIAGNOSIS — Z79.899 ENCOUNTER FOR LONG-TERM (CURRENT) USE OF MEDICATIONS: ICD-10-CM

## 2023-05-23 DIAGNOSIS — F31.9 BIPOLAR 1 DISORDER: ICD-10-CM

## 2023-05-23 DIAGNOSIS — Z00.00 ANNUAL PHYSICAL EXAM: Primary | ICD-10-CM

## 2023-05-23 DIAGNOSIS — E03.9 HYPOTHYROIDISM, UNSPECIFIED TYPE: ICD-10-CM

## 2023-05-23 DIAGNOSIS — N18.31 STAGE 3A CHRONIC KIDNEY DISEASE: ICD-10-CM

## 2023-05-23 DIAGNOSIS — Z00.00 ANNUAL PHYSICAL EXAM: ICD-10-CM

## 2023-05-23 LAB
ALBUMIN SERPL BCP-MCNC: 3.9 G/DL (ref 3.5–5.2)
ALP SERPL-CCNC: 40 U/L (ref 55–135)
ALT SERPL W/O P-5'-P-CCNC: 14 U/L (ref 10–44)
ANION GAP SERPL CALC-SCNC: 8 MMOL/L (ref 8–16)
AST SERPL-CCNC: 21 U/L (ref 10–40)
BASOPHILS # BLD AUTO: 0 K/UL (ref 0–0.2)
BASOPHILS NFR BLD: 0 % (ref 0–1.9)
BILIRUB SERPL-MCNC: 0.4 MG/DL (ref 0.1–1)
BUN SERPL-MCNC: 16 MG/DL (ref 8–23)
CALCIUM SERPL-MCNC: 10.3 MG/DL (ref 8.7–10.5)
CHLORIDE SERPL-SCNC: 108 MMOL/L (ref 95–110)
CHOLEST SERPL-MCNC: 157 MG/DL (ref 120–199)
CHOLEST/HDLC SERPL: 3.3 {RATIO} (ref 2–5)
CO2 SERPL-SCNC: 26 MMOL/L (ref 23–29)
CREAT SERPL-MCNC: 1.4 MG/DL (ref 0.5–1.4)
DIFFERENTIAL METHOD: ABNORMAL
EOSINOPHIL # BLD AUTO: 0 K/UL (ref 0–0.5)
EOSINOPHIL NFR BLD: 0 % (ref 0–8)
ERYTHROCYTE [DISTWIDTH] IN BLOOD BY AUTOMATED COUNT: 13.2 % (ref 11.5–14.5)
EST. GFR  (NO RACE VARIABLE): 39.5 ML/MIN/1.73 M^2
ESTIMATED AVG GLUCOSE: 100 MG/DL (ref 68–131)
GLUCOSE SERPL-MCNC: 75 MG/DL (ref 70–110)
HBA1C MFR BLD: 5.1 % (ref 4–5.6)
HCT VFR BLD AUTO: 39.6 % (ref 37–48.5)
HDLC SERPL-MCNC: 48 MG/DL (ref 40–75)
HDLC SERPL: 30.6 % (ref 20–50)
HGB BLD-MCNC: 12.3 G/DL (ref 12–16)
IMM GRANULOCYTES # BLD AUTO: 0.01 K/UL (ref 0–0.04)
IMM GRANULOCYTES NFR BLD AUTO: 0.4 % (ref 0–0.5)
LDLC SERPL CALC-MCNC: 90 MG/DL (ref 63–159)
LYMPHOCYTES # BLD AUTO: 0.8 K/UL (ref 1–4.8)
LYMPHOCYTES NFR BLD: 29.9 % (ref 18–48)
MCH RBC QN AUTO: 29.9 PG (ref 27–31)
MCHC RBC AUTO-ENTMCNC: 31.1 G/DL (ref 32–36)
MCV RBC AUTO: 96 FL (ref 82–98)
MONOCYTES # BLD AUTO: 0.3 K/UL (ref 0.3–1)
MONOCYTES NFR BLD: 10.9 % (ref 4–15)
NEUTROPHILS # BLD AUTO: 1.6 K/UL (ref 1.8–7.7)
NEUTROPHILS NFR BLD: 58.8 % (ref 38–73)
NONHDLC SERPL-MCNC: 109 MG/DL
NRBC BLD-RTO: 0 /100 WBC
PLATELET # BLD AUTO: 114 K/UL (ref 150–450)
PMV BLD AUTO: 12.2 FL (ref 9.2–12.9)
POTASSIUM SERPL-SCNC: 4.1 MMOL/L (ref 3.5–5.1)
PROT SERPL-MCNC: 6.4 G/DL (ref 6–8.4)
RBC # BLD AUTO: 4.12 M/UL (ref 4–5.4)
SODIUM SERPL-SCNC: 142 MMOL/L (ref 136–145)
TRIGL SERPL-MCNC: 95 MG/DL (ref 30–150)
TSH SERPL DL<=0.005 MIU/L-ACNC: 1.37 UIU/ML (ref 0.4–4)
WBC # BLD AUTO: 2.74 K/UL (ref 3.9–12.7)

## 2023-05-23 PROCEDURE — 80061 LIPID PANEL: CPT | Performed by: FAMILY MEDICINE

## 2023-05-23 PROCEDURE — 83036 HEMOGLOBIN GLYCOSYLATED A1C: CPT | Performed by: FAMILY MEDICINE

## 2023-05-23 PROCEDURE — 36415 COLL VENOUS BLD VENIPUNCTURE: CPT | Performed by: FAMILY MEDICINE

## 2023-05-23 PROCEDURE — 99213 OFFICE O/P EST LOW 20 MIN: CPT | Mod: PBBFAC | Performed by: FAMILY MEDICINE

## 2023-05-23 PROCEDURE — 85025 COMPLETE CBC W/AUTO DIFF WBC: CPT | Performed by: FAMILY MEDICINE

## 2023-05-23 PROCEDURE — 80053 COMPREHEN METABOLIC PANEL: CPT | Performed by: FAMILY MEDICINE

## 2023-05-23 PROCEDURE — 99999 PR PBB SHADOW E&M-EST. PATIENT-LVL III: ICD-10-PCS | Mod: PBBFAC,,, | Performed by: FAMILY MEDICINE

## 2023-05-23 PROCEDURE — 99999 PR PBB SHADOW E&M-EST. PATIENT-LVL III: CPT | Mod: PBBFAC,,, | Performed by: FAMILY MEDICINE

## 2023-05-23 PROCEDURE — 99397 PR PREVENTIVE VISIT,EST,65 & OVER: ICD-10-PCS | Mod: S$PBB,GZ,, | Performed by: FAMILY MEDICINE

## 2023-05-23 PROCEDURE — 99397 PER PM REEVAL EST PAT 65+ YR: CPT | Mod: S$PBB,GZ,, | Performed by: FAMILY MEDICINE

## 2023-05-23 PROCEDURE — 84443 ASSAY THYROID STIM HORMONE: CPT | Performed by: FAMILY MEDICINE

## 2023-05-23 NOTE — PROGRESS NOTES
Subjective:       Patient ID: María Henson is a 73 y.o. female.    Chief Complaint: Annual Exam    HPI    Patient Active Problem List   Diagnosis    CKD (chronic kidney disease) stage 3, GFR 30-59 ml/min    Bipolar 1 disorder    Hypothyroidism    Cognitive complaints with normal exam    Memory impairment    Lithium use    Unsteady gait when walking       Past Medical History:   Diagnosis Date    Bipolar 1 disorder     Hypothyroidism     Osteopenia        Past Surgical History:   Procedure Laterality Date    CYST REMOVAL      TONSILLECTOMY      WISDOM TOOTH EXTRACTION         Family History   Problem Relation Age of Onset    Heart failure Mother     Heart failure Brother     Diabetes Brother     Cancer Brother     Breast cancer Maternal Aunt        Social History     Tobacco Use   Smoking Status Former   Smokeless Tobacco Never       Wt Readings from Last 5 Encounters:   05/23/23 55.6 kg (122 lb 9.2 oz)   04/24/23 55.1 kg (121 lb 7.6 oz)   09/29/22 58.9 kg (129 lb 13.6 oz)   09/19/22 58.9 kg (129 lb 13.6 oz)   08/23/22 59.2 kg (130 lb 8.2 oz)       For further HPI details, see assessment and plan.    Review of Systems   Constitutional:  Negative for chills and fever.   HENT:  Negative for congestion, sinus pressure and voice change.    Eyes:  Negative for visual disturbance.   Respiratory:  Negative for shortness of breath.    Cardiovascular:  Negative for chest pain.   Gastrointestinal:  Negative for constipation and diarrhea.   Genitourinary:  Negative for difficulty urinating.   Musculoskeletal:  Negative for gait problem.   Skin:  Negative for rash.   Neurological:  Negative for dizziness and light-headedness.   Psychiatric/Behavioral:  Negative for dysphoric mood.      Objective:      Vitals:    05/23/23 0827   BP: 102/60   Pulse: 73   Temp: 97 °F (36.1 °C)       Physical Exam  Constitutional:       General: She is not in acute distress.     Appearance: Normal appearance. She is well-developed.    Cardiovascular:      Rate and Rhythm: Normal rate and regular rhythm.   Pulmonary:      Effort: Pulmonary effort is normal. No respiratory distress.      Breath sounds: Normal breath sounds.   Musculoskeletal:         General: Normal range of motion.   Neurological:      Mental Status: She is alert. She is not disoriented.   Psychiatric:         Mood and Affect: Mood normal.         Speech: Speech normal.       Assessment:       1. Annual physical exam    2. Encounter for long-term (current) use of medications    3. Bipolar 1 disorder    4. Hypothyroidism, unspecified type    5. Stage 3a chronic kidney disease        Plan:   Annual physical exam  -     CBC Auto Differential; Future; Expected date: 05/23/2023  -     Comprehensive Metabolic Panel; Future; Expected date: 05/23/2023  -     Hemoglobin A1C; Future; Expected date: 05/23/2023  -     Lipid Panel; Future; Expected date: 05/23/2023  -     TSH; Future; Expected date: 05/23/2023    Encounter for long-term (current) use of medications  -     CBC Auto Differential; Future; Expected date: 05/23/2023  -     Comprehensive Metabolic Panel; Future; Expected date: 05/23/2023  -     Hemoglobin A1C; Future; Expected date: 05/23/2023  -     Lipid Panel; Future; Expected date: 05/23/2023  -     TSH; Future; Expected date: 05/23/2023    Bipolar 1 disorder    Hypothyroidism, unspecified type    Stage 3a chronic kidney disease          Patient presents today for an annual exam     I can see in April she saw the PA for a foreign body underneath a nail.  It was removed without issue.    Reviewed recent labs.  She was having some hypercalcemia with a very mild elevation in PTH.  Calcemia normalized.  We will continue to monitor it via metabolic panel    We will update her lab work.    In September she saw the Neurology Department with cognitive complaints.  I am very pleased to hear that her memory is actually improved.  She is feeling better.  Uncertain to reason why.  She was  also evaluated at the [facility] with studies being performed there.  Unfortunately I have no records of such.    At our last encounter we discussed Plavix.  It was started by an external neurologist.  Patient had bruising issues with Plavix.  She is done better with aspirin it continues to use such  Once again result review issues are problematic.  I am going to ask staff to get me the results so I can see what has occurred at the [facility] just to be up-to-date    Hypothyroidism  We will update her TSH is part of her annual exam.  She is on Cytomel.  This replaced Synthroid.  Also used to augment her depression treatment.  Continue to monitor TSH    Chronic kidney disease stage IIIA  We will continue to monitor   She knows to avoid nonsteroidal anti-inflammatory drugs    Bipolar   Still managed by the same psychiatrist Dr. Sixto Cross  Patient is no longer using Klonopin.  Removing from drug list  She is using Cymbalta, Lamictal, Cytomel, lithium, Seroquel, Caplyta for her mental  health meds  Seems to be doing well.  She finds the Capltya has been very beneficial for her.  She has no EPS symptoms    Patient is aware she is overdue for her mammogram.  Offered to get study performed, she prefers to do this via her gynecologist.  She is making a transition with gynecologist at present.  I encouraged her to get me those records once the study is obtained.      No falls.  Patient has steady gait.  No shaking.  This is an improvement as she was having issues.    Patient had a sleep apnea test.  She reports she does not have sleep apnea    She reports all of the studies came back normal.    Immunizations discussed.  It appears she had her 1st shingles shot in 2020.  We will update her on this.  At present not interested in COVID booster.  We will revisit this in the future.    Follow-up in 6 months    This note was verbally dictated, please excuse any type errors.

## 2023-05-24 DIAGNOSIS — R79.9 ABNORMAL FINDING OF BLOOD CHEMISTRY, UNSPECIFIED: ICD-10-CM

## 2023-05-24 DIAGNOSIS — D72.819 LEUKOPENIA, UNSPECIFIED TYPE: Primary | ICD-10-CM

## 2023-05-24 DIAGNOSIS — Z79.899 OTHER LONG TERM (CURRENT) DRUG THERAPY: ICD-10-CM

## 2023-06-27 NOTE — PROGRESS NOTES
Subjective:       Patient ID: María Henson is a 73 y.o. female.    Someone told the patient she would chronic kidney disease stage 4.  I could see she has stage 3 chronic kidney disease for many years.  Electrolytes normal creatinine normal GFR with end-stage 3.  She is requesting Nephrology consultation.  I would appreciate an opportunity discuss with her 1st prior to seeking out specialty care as things seem to be stable and I want her to understand the situation fully.  I am requesting allotted time to discuss with her.

## 2023-08-01 ENCOUNTER — LAB VISIT (OUTPATIENT)
Dept: LAB | Facility: HOSPITAL | Age: 73
End: 2023-08-01
Attending: FAMILY MEDICINE
Payer: MEDICARE

## 2023-08-01 DIAGNOSIS — D72.819 LEUKOPENIA, UNSPECIFIED TYPE: ICD-10-CM

## 2023-08-01 DIAGNOSIS — R79.9 ABNORMAL FINDING OF BLOOD CHEMISTRY, UNSPECIFIED: ICD-10-CM

## 2023-08-01 DIAGNOSIS — Z79.899 OTHER LONG TERM (CURRENT) DRUG THERAPY: ICD-10-CM

## 2023-08-01 PROCEDURE — 82728 ASSAY OF FERRITIN: CPT | Performed by: FAMILY MEDICINE

## 2023-08-01 PROCEDURE — 83540 ASSAY OF IRON: CPT | Performed by: FAMILY MEDICINE

## 2023-08-01 PROCEDURE — 84466 ASSAY OF TRANSFERRIN: CPT | Performed by: FAMILY MEDICINE

## 2023-08-01 PROCEDURE — 36415 COLL VENOUS BLD VENIPUNCTURE: CPT | Mod: PN | Performed by: FAMILY MEDICINE

## 2023-08-01 PROCEDURE — 85025 COMPLETE CBC W/AUTO DIFF WBC: CPT | Performed by: FAMILY MEDICINE

## 2023-08-01 PROCEDURE — 82607 VITAMIN B-12: CPT | Performed by: FAMILY MEDICINE

## 2023-08-01 PROCEDURE — 82746 ASSAY OF FOLIC ACID SERUM: CPT | Performed by: FAMILY MEDICINE

## 2023-08-02 LAB
BASOPHILS # BLD AUTO: 0 K/UL (ref 0–0.2)
BASOPHILS NFR BLD: 0 % (ref 0–1.9)
DIFFERENTIAL METHOD: ABNORMAL
EOSINOPHIL # BLD AUTO: 0 K/UL (ref 0–0.5)
EOSINOPHIL NFR BLD: 0 % (ref 0–8)
ERYTHROCYTE [DISTWIDTH] IN BLOOD BY AUTOMATED COUNT: 13.1 % (ref 11.5–14.5)
FERRITIN SERPL-MCNC: 53 NG/ML (ref 20–300)
FOLATE SERPL-MCNC: 12.9 NG/ML (ref 4–24)
HCT VFR BLD AUTO: 39.3 % (ref 37–48.5)
HGB BLD-MCNC: 12.3 G/DL (ref 12–16)
IMM GRANULOCYTES # BLD AUTO: 0.01 K/UL (ref 0–0.04)
IMM GRANULOCYTES NFR BLD AUTO: 0.4 % (ref 0–0.5)
IRON SERPL-MCNC: 76 UG/DL (ref 30–160)
LYMPHOCYTES # BLD AUTO: 0.6 K/UL (ref 1–4.8)
LYMPHOCYTES NFR BLD: 21.1 % (ref 18–48)
MCH RBC QN AUTO: 29.4 PG (ref 27–31)
MCHC RBC AUTO-ENTMCNC: 31.3 G/DL (ref 32–36)
MCV RBC AUTO: 94 FL (ref 82–98)
MONOCYTES # BLD AUTO: 0.2 K/UL (ref 0.3–1)
MONOCYTES NFR BLD: 9 % (ref 4–15)
NEUTROPHILS # BLD AUTO: 1.9 K/UL (ref 1.8–7.7)
NEUTROPHILS NFR BLD: 69.5 % (ref 38–73)
NRBC BLD-RTO: 0 /100 WBC
PLATELET # BLD AUTO: 110 K/UL (ref 150–450)
PMV BLD AUTO: 12.9 FL (ref 9.2–12.9)
RBC # BLD AUTO: 4.18 M/UL (ref 4–5.4)
SATURATED IRON: 18 % (ref 20–50)
TOTAL IRON BINDING CAPACITY: 423 UG/DL (ref 250–450)
TRANSFERRIN SERPL-MCNC: 286 MG/DL (ref 200–375)
VIT B12 SERPL-MCNC: 966 PG/ML (ref 210–950)
WBC # BLD AUTO: 2.66 K/UL (ref 3.9–12.7)

## 2023-08-03 ENCOUNTER — PATIENT MESSAGE (OUTPATIENT)
Dept: ADMINISTRATIVE | Facility: HOSPITAL | Age: 73
End: 2023-08-03
Payer: MEDICARE

## 2023-08-03 ENCOUNTER — TELEPHONE (OUTPATIENT)
Dept: ADMINISTRATIVE | Facility: HOSPITAL | Age: 73
End: 2023-08-03
Payer: MEDICARE

## 2023-08-17 ENCOUNTER — TELEPHONE (OUTPATIENT)
Dept: INTERNAL MEDICINE | Facility: CLINIC | Age: 73
End: 2023-08-17
Payer: MEDICARE

## 2023-08-17 DIAGNOSIS — R79.89 ABNORMAL CBC: Primary | ICD-10-CM

## 2023-08-17 NOTE — TELEPHONE ENCOUNTER
----- Message from Thomas Benton sent at 8/17/2023  4:46 PM CDT -----  Contact: María  Type:  Patient Returning Call    Who Called:maría  Who Left Message for Patient:nurse  Does the patient know what this is regarding?:missed call  Would the patient rather a call back or a response via [employer]? call  Best Call Back Number:[phone]  Additional Information: concerning labs

## 2023-08-17 NOTE — TELEPHONE ENCOUNTER
----- Message from Emilie Archibald MD sent at 8/2/2023  4:10 PM CDT -----  Please review and advise patient on your return      Noted.  Requesting staff arrange for Hematology consultation to review persistently low white blood cells and platelets

## 2023-08-24 ENCOUNTER — TELEPHONE (OUTPATIENT)
Dept: INTERNAL MEDICINE | Facility: CLINIC | Age: 73
End: 2023-08-24
Payer: MEDICARE

## 2023-08-24 NOTE — TELEPHONE ENCOUNTER
----- Message from Candice Schneider sent at 8/24/2023  1:48 PM CDT -----  Contact: patient  [phone]  Patient called requesting a call back from Dr. Bolaños's nurse, regarding sending her most recent clinical note and labs to another provider

## 2023-08-25 ENCOUNTER — TELEPHONE (OUTPATIENT)
Dept: INTERNAL MEDICINE | Facility: CLINIC | Age: 73
End: 2023-08-25
Payer: MEDICARE

## 2023-08-25 NOTE — TELEPHONE ENCOUNTER
----- Message from Rosa Guerrero sent at 8/25/2023  3:29 PM CDT -----  Contact: self [phone]  Patient called back after missing a call from your office. Please call back [phone]. Thanks tpw

## 2023-08-25 NOTE — TELEPHONE ENCOUNTER
----- Message from Felicita Cornell sent at 8/24/2023  5:42 PM CDT -----  Contact: pt  Type:  Patient Returning Call    Who Called:pt  Who Left Message for Patient:Joe  Does the patient know what this is regarding?:clinical notes and labs   Would the patient rather a call back or a response via [facility]? Call   Best Call Back Number:[phone]  Additional Information:

## 2023-08-25 NOTE — TELEPHONE ENCOUNTER
Called. Patient will get in touch us Monday once she got the correct name and fax number of the hematology she gonna see from [facility] to have her last OV notes and lab test result copy fax over to them.

## 2023-08-29 ENCOUNTER — TELEPHONE (OUTPATIENT)
Dept: INTERNAL MEDICINE | Facility: CLINIC | Age: 73
End: 2023-08-29
Payer: MEDICARE

## 2023-08-29 NOTE — TELEPHONE ENCOUNTER
----- Message from Divine Fraser sent at 8/29/2023 10:10 AM CDT -----  Contact: María Daniel needs a call back at [phone], Regards to getting her recent lab results sent a different doctor and also if she received her Shingles shot..    Thanks  Td

## 2023-11-28 ENCOUNTER — TELEPHONE (OUTPATIENT)
Dept: INTERNAL MEDICINE | Facility: CLINIC | Age: 73
End: 2023-11-28
Payer: MEDICARE

## 2023-11-28 NOTE — TELEPHONE ENCOUNTER
----- Message from Ambar Snyder sent at 11/28/2023 10:04 AM CST -----  Contact: María  .Patient is calling to speak with the nurse regarding appt  . Reports needing a sooner appt due to the patient running late for last appt . Please give patient a call back at .[phone]

## 2024-01-11 DIAGNOSIS — Z00.00 ENCOUNTER FOR MEDICARE ANNUAL WELLNESS EXAM: ICD-10-CM

## 2024-03-15 ENCOUNTER — OFFICE VISIT (OUTPATIENT)
Dept: INTERNAL MEDICINE | Facility: CLINIC | Age: 74
End: 2024-03-15
Payer: MEDICARE

## 2024-03-15 VITALS
BODY MASS INDEX: 22.03 KG/M2 | HEART RATE: 70 BPM | SYSTOLIC BLOOD PRESSURE: 118 MMHG | WEIGHT: 124.31 LBS | DIASTOLIC BLOOD PRESSURE: 78 MMHG | RESPIRATION RATE: 18 BRPM | OXYGEN SATURATION: 97 %

## 2024-03-15 DIAGNOSIS — E03.9 HYPOTHYROIDISM, UNSPECIFIED TYPE: ICD-10-CM

## 2024-03-15 DIAGNOSIS — F31.64 BIPOLAR DISORDER, CURRENT EPISODE MIXED, SEVERE, WITH PSYCHOTIC FEATURES: ICD-10-CM

## 2024-03-15 DIAGNOSIS — R41.3 MEMORY DEFICITS: ICD-10-CM

## 2024-03-15 DIAGNOSIS — Z79.899 OTHER LONG TERM (CURRENT) DRUG THERAPY: ICD-10-CM

## 2024-03-15 DIAGNOSIS — K59.09 CHRONIC CONSTIPATION: ICD-10-CM

## 2024-03-15 DIAGNOSIS — F31.9 BIPOLAR 1 DISORDER: ICD-10-CM

## 2024-03-15 DIAGNOSIS — D72.819 LEUKOPENIA, UNSPECIFIED TYPE: Primary | ICD-10-CM

## 2024-03-15 DIAGNOSIS — N18.32 STAGE 3B CHRONIC KIDNEY DISEASE: ICD-10-CM

## 2024-03-15 PROCEDURE — 99214 OFFICE O/P EST MOD 30 MIN: CPT | Mod: S$PBB,,, | Performed by: FAMILY MEDICINE

## 2024-03-15 PROCEDURE — 99999 PR PBB SHADOW E&M-EST. PATIENT-LVL III: CPT | Mod: PBBFAC,,, | Performed by: FAMILY MEDICINE

## 2024-03-15 PROCEDURE — 99213 OFFICE O/P EST LOW 20 MIN: CPT | Mod: PBBFAC | Performed by: FAMILY MEDICINE

## 2024-03-15 RX ORDER — LUMATEPERONE 42 MG/1
1 CAPSULE ORAL
COMMUNITY
Start: 2024-02-21

## 2024-03-15 NOTE — PROGRESS NOTES
Subjective:       Patient ID: María Henson is a 73 y.o. female.    Chief Complaint: No chief complaint on file.    HPI    Patient Active Problem List   Diagnosis    CKD (chronic kidney disease) stage 3, GFR 30-59 ml/min    Bipolar 1 disorder    Hypothyroidism    Cognitive complaints with normal exam    Memory impairment    Lithium use    Unsteady gait when walking       Past Medical History:   Diagnosis Date    Bipolar 1 disorder     Hypothyroidism     Osteopenia        Past Surgical History:   Procedure Laterality Date    CYST REMOVAL      TONSILLECTOMY      WISDOM TOOTH EXTRACTION         Family History   Problem Relation Age of Onset    Heart failure Mother     Breast cancer Maternal Aunt     Breast cancer Paternal Aunt     Heart failure Brother     Diabetes Brother     Cancer Brother        Social History     Tobacco Use   Smoking Status Former    Current packs/day: 0.00    Average packs/day: 0.3 packs/day for 1 year (0.3 ttl pk-yrs)    Types: Cigarettes    Start date:     Quit date:     Years since quittin.2    Passive exposure: Past   Smokeless Tobacco Never       Wt Readings from Last 5 Encounters:   03/15/24 56.4 kg (124 lb 5.4 oz)   10/03/23 56.7 kg (125 lb)   23 55.6 kg (122 lb 9.2 oz)   23 55.1 kg (121 lb 7.6 oz)   22 58.9 kg (129 lb 13.6 oz)       For further HPI details, see assessment and plan.    Review of Systems    Objective:      Vitals:    03/15/24 1630   BP: 118/78   Pulse: 70   Resp: 18       Physical Exam  Constitutional:       General: She is not in acute distress.     Appearance: She is not ill-appearing.   Pulmonary:      Effort: Pulmonary effort is normal. No respiratory distress.   Neurological:      General: No focal deficit present.      Mental Status: She is alert.   Psychiatric:         Mood and Affect: Mood normal.         Behavior: Behavior normal.         Assessment:       1. Leukopenia, unspecified type    2. Bipolar 1 disorder    3.  Hypothyroidism, unspecified type    4. Memory deficits    5. Other long term (current) drug therapy    6. Stage 3b chronic kidney disease    7. Chronic constipation        Plan:     Patient presents for follow-up on chronic condition    Leukopenia   Noted mildly diminished white blood cell but persistent.  Recommended Hematology consultation.  I can see communication about her potentially seeing someone at [facility].  I do not believe that happened.  Plan to repeat CBC and if white blood cells are continually diminished plan to arrange for [facility] hemat[facility] evaluation    Hypothyroidism  Last TSH was normal   Plan to continue to monitor   Patient uses cyto male.  She utilizes this as it is also used to address her mental health.  As long as her TSH is appropriate I am comfortable with such     Chronic idiopathic constipation  Patient uses a supplement.  Discussed hydration, exercise, fiber supplementation, Metamucil, MiraLax.  If all these fails and if her supplement (keep it moving) fails we can explore Linzess as a potential option    Bipolar  Mental health   Continue medications as per her psychiatrist.  No changes.    Memory deficits  Ongoing issue   Seems to be impacting her work to a degree now.  Would encourage he follows up with her neurologist for further discussions.      Allergic rhinitis  Patient is still use Flonase.  I am comfortable such.  Continue usage of such.      Chronic kidney disease stage IIIB   Continue good pressure control continue good glucose control.  Continue to avoid nonsteroidal anti-inflammatory drugs.  Continue to monitor.  If renal function declines any further into stage 4 chronic kidney disease we will consult Nephrology.    Skin changes on knees  Patient saw external dermatologist  Christina storm diagnosed by biopsy.  I'm not with diagnosis.  Defer to Dermatology Seems relatively mild.  No specific treatment reported by patient possible    Updating labs  6 mo  f/u    This note was verbally dictated, please excuse any type errors.

## 2024-03-20 ENCOUNTER — LAB VISIT (OUTPATIENT)
Dept: LAB | Facility: HOSPITAL | Age: 74
End: 2024-03-20
Attending: FAMILY MEDICINE
Payer: MEDICARE

## 2024-03-20 DIAGNOSIS — Z79.899 OTHER LONG TERM (CURRENT) DRUG THERAPY: ICD-10-CM

## 2024-03-20 PROCEDURE — 84443 ASSAY THYROID STIM HORMONE: CPT | Performed by: FAMILY MEDICINE

## 2024-03-20 PROCEDURE — 85025 COMPLETE CBC W/AUTO DIFF WBC: CPT | Performed by: FAMILY MEDICINE

## 2024-03-20 PROCEDURE — 80053 COMPREHEN METABOLIC PANEL: CPT | Performed by: FAMILY MEDICINE

## 2024-03-20 PROCEDURE — 82607 VITAMIN B-12: CPT | Performed by: FAMILY MEDICINE

## 2024-03-20 PROCEDURE — 83036 HEMOGLOBIN GLYCOSYLATED A1C: CPT | Performed by: FAMILY MEDICINE

## 2024-03-20 PROCEDURE — 36415 COLL VENOUS BLD VENIPUNCTURE: CPT | Mod: PN | Performed by: FAMILY MEDICINE

## 2024-03-21 LAB
ALBUMIN SERPL BCP-MCNC: 4 G/DL (ref 3.5–5.2)
ALP SERPL-CCNC: 39 U/L (ref 55–135)
ALT SERPL W/O P-5'-P-CCNC: 15 U/L (ref 10–44)
ANION GAP SERPL CALC-SCNC: 8 MMOL/L (ref 8–16)
AST SERPL-CCNC: 19 U/L (ref 10–40)
BASOPHILS # BLD AUTO: 0 K/UL (ref 0–0.2)
BASOPHILS NFR BLD: 0 % (ref 0–1.9)
BILIRUB SERPL-MCNC: 0.4 MG/DL (ref 0.1–1)
BUN SERPL-MCNC: 19 MG/DL (ref 8–23)
CALCIUM SERPL-MCNC: 10 MG/DL (ref 8.7–10.5)
CHLORIDE SERPL-SCNC: 111 MMOL/L (ref 95–110)
CO2 SERPL-SCNC: 23 MMOL/L (ref 23–29)
CREAT SERPL-MCNC: 1.2 MG/DL (ref 0.5–1.4)
DIFFERENTIAL METHOD BLD: ABNORMAL
EOSINOPHIL # BLD AUTO: 0 K/UL (ref 0–0.5)
EOSINOPHIL NFR BLD: 0 % (ref 0–8)
ERYTHROCYTE [DISTWIDTH] IN BLOOD BY AUTOMATED COUNT: 13.6 % (ref 11.5–14.5)
EST. GFR  (NO RACE VARIABLE): 47.5 ML/MIN/1.73 M^2
ESTIMATED AVG GLUCOSE: 103 MG/DL (ref 68–131)
GLUCOSE SERPL-MCNC: 63 MG/DL (ref 70–110)
HBA1C MFR BLD: 5.2 % (ref 4–5.6)
HCT VFR BLD AUTO: 39.3 % (ref 37–48.5)
HGB BLD-MCNC: 12.3 G/DL (ref 12–16)
IMM GRANULOCYTES # BLD AUTO: 0.02 K/UL (ref 0–0.04)
IMM GRANULOCYTES NFR BLD AUTO: 0.6 % (ref 0–0.5)
LYMPHOCYTES # BLD AUTO: 0.8 K/UL (ref 1–4.8)
LYMPHOCYTES NFR BLD: 21.9 % (ref 18–48)
MCH RBC QN AUTO: 29.5 PG (ref 27–31)
MCHC RBC AUTO-ENTMCNC: 31.3 G/DL (ref 32–36)
MCV RBC AUTO: 94 FL (ref 82–98)
MONOCYTES # BLD AUTO: 0.3 K/UL (ref 0.3–1)
MONOCYTES NFR BLD: 7.5 % (ref 4–15)
NEUTROPHILS # BLD AUTO: 2.4 K/UL (ref 1.8–7.7)
NEUTROPHILS NFR BLD: 70 % (ref 38–73)
NRBC BLD-RTO: 0 /100 WBC
PLATELET # BLD AUTO: 120 K/UL (ref 150–450)
PMV BLD AUTO: 12.8 FL (ref 9.2–12.9)
POTASSIUM SERPL-SCNC: 3.7 MMOL/L (ref 3.5–5.1)
PROT SERPL-MCNC: 6.6 G/DL (ref 6–8.4)
RBC # BLD AUTO: 4.17 M/UL (ref 4–5.4)
SODIUM SERPL-SCNC: 142 MMOL/L (ref 136–145)
TSH SERPL DL<=0.005 MIU/L-ACNC: 2.08 UIU/ML (ref 0.4–4)
VIT B12 SERPL-MCNC: 494 PG/ML (ref 210–950)
WBC # BLD AUTO: 3.47 K/UL (ref 3.9–12.7)

## 2024-03-27 DIAGNOSIS — D69.6 THROMBOCYTOPENIA: Primary | ICD-10-CM

## 2024-08-08 RX ORDER — LIOTHYRONINE SODIUM 5 UG/1
TABLET ORAL
Qty: 180 TABLET | Refills: 2 | Status: SHIPPED | OUTPATIENT
Start: 2024-08-08

## 2024-09-24 ENCOUNTER — LAB VISIT (OUTPATIENT)
Dept: LAB | Facility: HOSPITAL | Age: 74
End: 2024-09-24
Attending: FAMILY MEDICINE
Payer: MEDICARE

## 2024-09-24 ENCOUNTER — OFFICE VISIT (OUTPATIENT)
Dept: INTERNAL MEDICINE | Facility: CLINIC | Age: 74
End: 2024-09-24
Payer: MEDICARE

## 2024-09-24 VITALS
OXYGEN SATURATION: 98 % | SYSTOLIC BLOOD PRESSURE: 114 MMHG | HEART RATE: 89 BPM | WEIGHT: 126.75 LBS | HEIGHT: 63 IN | TEMPERATURE: 97 F | DIASTOLIC BLOOD PRESSURE: 70 MMHG | BODY MASS INDEX: 22.46 KG/M2

## 2024-09-24 DIAGNOSIS — D72.819 LEUKOPENIA, UNSPECIFIED TYPE: ICD-10-CM

## 2024-09-24 DIAGNOSIS — Z79.899 ENCOUNTER FOR LONG-TERM (CURRENT) USE OF MEDICATIONS: ICD-10-CM

## 2024-09-24 DIAGNOSIS — K59.09 CHRONIC CONSTIPATION: ICD-10-CM

## 2024-09-24 DIAGNOSIS — N18.31 STAGE 3A CHRONIC KIDNEY DISEASE: ICD-10-CM

## 2024-09-24 DIAGNOSIS — D69.6 THROMBOCYTOPENIA: ICD-10-CM

## 2024-09-24 DIAGNOSIS — E03.9 HYPOTHYROIDISM, UNSPECIFIED TYPE: ICD-10-CM

## 2024-09-24 DIAGNOSIS — M85.80 OSTEOPENIA, UNSPECIFIED LOCATION: ICD-10-CM

## 2024-09-24 DIAGNOSIS — D72.819 LEUKOPENIA, UNSPECIFIED TYPE: Primary | ICD-10-CM

## 2024-09-24 DIAGNOSIS — Z12.11 SCREENING FOR COLON CANCER: ICD-10-CM

## 2024-09-24 DIAGNOSIS — R41.3 MEMORY DEFICITS: ICD-10-CM

## 2024-09-24 DIAGNOSIS — F31.73 BIPOLAR DISORDER, IN PARTIAL REMISSION, MOST RECENT EPISODE MANIC: ICD-10-CM

## 2024-09-24 LAB
ALBUMIN SERPL BCP-MCNC: 4 G/DL (ref 3.5–5.2)
ALP SERPL-CCNC: 43 U/L (ref 55–135)
ALT SERPL W/O P-5'-P-CCNC: 13 U/L (ref 10–44)
ANION GAP SERPL CALC-SCNC: 8 MMOL/L (ref 8–16)
AST SERPL-CCNC: 19 U/L (ref 10–40)
BILIRUB SERPL-MCNC: 0.3 MG/DL (ref 0.1–1)
BUN SERPL-MCNC: 16 MG/DL (ref 8–23)
CALCIUM SERPL-MCNC: 10.6 MG/DL (ref 8.7–10.5)
CHLORIDE SERPL-SCNC: 108 MMOL/L (ref 95–110)
CO2 SERPL-SCNC: 27 MMOL/L (ref 23–29)
CREAT SERPL-MCNC: 1.1 MG/DL (ref 0.5–1.4)
EST. GFR  (NO RACE VARIABLE): 52.4 ML/MIN/1.73 M^2
GLUCOSE SERPL-MCNC: 103 MG/DL (ref 70–110)
LITHIUM SERPL-SCNC: 0.8 MMOL/L (ref 0.6–1.2)
POTASSIUM SERPL-SCNC: 4.4 MMOL/L (ref 3.5–5.1)
PROT SERPL-MCNC: 6.5 G/DL (ref 6–8.4)
SODIUM SERPL-SCNC: 143 MMOL/L (ref 136–145)

## 2024-09-24 PROCEDURE — 99999 PR PBB SHADOW E&M-EST. PATIENT-LVL III: CPT | Mod: PBBFAC,,, | Performed by: FAMILY MEDICINE

## 2024-09-24 PROCEDURE — 36415 COLL VENOUS BLD VENIPUNCTURE: CPT | Performed by: FAMILY MEDICINE

## 2024-09-24 PROCEDURE — G2211 COMPLEX E/M VISIT ADD ON: HCPCS | Mod: S$PBB,,, | Performed by: FAMILY MEDICINE

## 2024-09-24 PROCEDURE — 84443 ASSAY THYROID STIM HORMONE: CPT | Performed by: FAMILY MEDICINE

## 2024-09-24 PROCEDURE — 99215 OFFICE O/P EST HI 40 MIN: CPT | Mod: S$PBB,,, | Performed by: FAMILY MEDICINE

## 2024-09-24 PROCEDURE — 80053 COMPREHEN METABOLIC PANEL: CPT | Performed by: FAMILY MEDICINE

## 2024-09-24 PROCEDURE — 99213 OFFICE O/P EST LOW 20 MIN: CPT | Mod: PBBFAC | Performed by: FAMILY MEDICINE

## 2024-09-24 PROCEDURE — 85025 COMPLETE CBC W/AUTO DIFF WBC: CPT | Performed by: FAMILY MEDICINE

## 2024-09-24 PROCEDURE — 80178 ASSAY OF LITHIUM: CPT | Performed by: FAMILY MEDICINE

## 2024-09-24 NOTE — PROGRESS NOTES
Subjective:       Patient ID: María Henson is a 74 y.o. female.    Chief Complaint: f/u visit    HPI    Patient Active Problem List   Diagnosis    CKD (chronic kidney disease) stage 3, GFR 30-59 ml/min    Bipolar 1 disorder    Hypothyroidism    Cognitive complaints with normal exam    Memory impairment    Lithium use    Unsteady gait when walking    Bipolar disorder, current episode mixed, severe, with psychotic features       Past Medical History:   Diagnosis Date    Bipolar 1 disorder     Hypothyroidism     Osteopenia        Past Surgical History:   Procedure Laterality Date    CYST REMOVAL      TONSILLECTOMY      WISDOM TOOTH EXTRACTION         Family History   Problem Relation Name Age of Onset    Heart failure Mother      Breast cancer Maternal Aunt      Breast cancer Paternal Aunt      Heart failure Brother      Diabetes Brother      Cancer Brother         Social History     Tobacco Use   Smoking Status Former    Current packs/day: 0.00    Average packs/day: 0.3 packs/day for 1 year (0.3 ttl pk-yrs)    Types: Cigarettes    Start date:     Quit date:     Years since quittin.7    Passive exposure: Past   Smokeless Tobacco Never       Wt Readings from Last 5 Encounters:   24 57.5 kg (126 lb 12.2 oz)   03/15/24 56.4 kg (124 lb 5.4 oz)   10/03/23 56.7 kg (125 lb)   23 55.6 kg (122 lb 9.2 oz)   23 55.1 kg (121 lb 7.6 oz)   .  History of Present Illness    CHIEF COMPLAINT:  Chief Complaint    HEMATOLOGIC:  She reports no specific symptoms related to her blood count abnormalities. She is aware of ongoing monitoring of her white blood cell and platelet counts. She acknowledges a previous recommendation for hematology consultation, but is uncertain about whether she has seen a hematologist or where the consultation may have taken place. She understands that if her blood count abnormalities persist, a referral to a hematologist will be recommended.    THYROID:  She is currently taking  "Cytomel 5 mcg for thyroid function. Her last thyroid function test in March was normal. A thyroid stimulating hormone test will be performed today as part of routine biannual monitoring.    GASTROINTESTINAL:  She reports improvement in bowel movements related to her chronic idiopathic constipation. She denies currently taking any supplements or medications for this condition. She mentions a previous unsuccessful treatment attempt with MiraLax. Her bowel movements have spontaneously improved, experiencing "great" movements for days at a time. She reports having a previous colonoscopy in 2017 performed by Dr. Daren Gutierrez at [facility] on [facility]. At that time, gastroenterology advised her that she would not need another colonoscopy for seven years. She is now due - wants to go thru previous MD for such - not [facility]    PSYCHIATRIC:  She reports recent manic episodes, experiencing a few episodes in the last month and a half. She is currently under the care of a psychiatrist and is scheduled for follow up in three months. Her current medication regimen for bipolar disorder includes Seroquel, lithium, Lamictal, Cymbalta, and Caplyta. She reports that Caplyta, a new addition to her regimen, is working "marvelously" and expresses satisfaction with its effectiveness. She confirms a history of bipolar manic depressive disorder.    NEUROLOGIC:  She reports worsening short-term memory issues, particularly difficulty recalling names shortly after writing them down. She previously underwent neurological testing, which indicated her memory with interruption was low but considered normal for her age. Despite this, she feels symptoms have progressed. 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 She acknowledges the possibility that difficulty remembering names could be due to the rapid transition between cases.        ALLERGIES:  She continues to " use Flonase for management of allergic rhinitis symptoms. successfully    MUSCULOSKELETAL:  She has a history of osteopenia with mild bone density loss. Her last DEXA scan was performed in 2022. She is agreeable to repeating the scan next year.  She reports regular exercise. She is taking a vitamin D supplement and getting sun exposure as tolerated up to 30 minutes daily. She denies excessive alcohol consumption. Regarding caffeine in  take, she admits to drinking more than one cup of coffee per day, but reports diluting it with water.        Objective:      Vitals:    09/24/24 1536   BP: 114/70   Pulse: 89   Temp: 97 °F (36.1 °C)       Physical Exam  Constitutional:       General: She is not in acute distress.     Appearance: She is not ill-appearing.   Pulmonary:      Effort: Pulmonary effort is normal. No respiratory distress.   Neurological:      General: No focal deficit present.      Mental Status: She is alert.   Psychiatric:         Mood and Affect: Mood normal.         Behavior: Behavior normal.         Assessment:       1. Leukopenia, unspecified type    2. Hypothyroidism, unspecified type    3. Memory deficits    4. Bipolar disorder, in partial remission, most recent episode manic    5. Encounter for long-term (current) use of medications    6. Stage 3a chronic kidney disease    7. Screening for colon cancer    8. Chronic constipation    9. Thrombocytopenia    10. Osteopenia, unspecified location        Plan:   Assessment & Plan    Monitored white blood cell counts; CBC on 3/20 showed mild improvement but still low, along with low platelet count. If low still consulting heme.    Continued current thyroid medication (Cytomel 5 mcg) as thyroid function normal in March    Chronic idiopathic constipation improved without intervention    Mental health stable on current medication regimen    Concerned about worsening short-term memory deficits, despite previous neurological testing showing age-appropriate  results. Arranging consultation here at [facility].    Monitored chronic kidney disease stage 3A . Advised avoid nsaids.    Last colonoscopy in 2017; due for repeat in 2024 based on 7-year interval recommendation - ask she gets me records once done.      History of osteopenia; planning DEXA scan next year. Discussed lifestyle mngt.  BONE HEALTH:  - Emphasized the importance of limiting caffeine intake for bone health.  - Patient to take vitamin D supplement.  - Recommend getting up to 30 minutes of sunshine daily as tolerated.  - Recommend limiting caffeine intake to 1 cup of coffee per day (or 2 cups if diluted with water).    LITHIUM THERAPY:  - Reviewed the significance of monitoring lithium levels for patients on lithium therapy.  - Lithium level ordered.  Uncertain why not monitored by her prescribing but she request it check and it seems reasonable.     GENERAL HEALTH RECOMMENDATIONS:  - Patient to exercise regularly.  - Patient to avoid excessive alcohol consumption.    THYROID DISORDER:  - Continued Cytomel 5 mcg for thyroid function.  - Thyroid Stimulating Hormone (TSH) ordered.    CARDIOVASCULAR HEALTH:  - Continued aspirin daily as long as no bleeding occurs    ALLERGIC RHINITIS:  - Continued Flonase for allergic rhinitis.    LABS:  - CBC (Complete Blood Count) ordered.  - CMP (Comprehensive Metabolic Panel) ordered.    MEMORY DEFICITS:  - Referred to Neurology for consultation regarding worsening memory deficits.    FOLLOW UP:  - Follow up in 6 months.           This note was verbally dictated, please excuse any type errors.

## 2024-09-25 ENCOUNTER — TELEPHONE (OUTPATIENT)
Dept: INTERNAL MEDICINE | Facility: CLINIC | Age: 74
End: 2024-09-25
Payer: MEDICARE

## 2024-09-25 DIAGNOSIS — D69.6 THROMBOCYTOPENIA: Primary | ICD-10-CM

## 2024-09-25 DIAGNOSIS — E83.51 HYPOCALCEMIA: ICD-10-CM

## 2024-09-25 LAB
BASOPHILS # BLD AUTO: 0 K/UL (ref 0–0.2)
BASOPHILS NFR BLD: 0 % (ref 0–1.9)
DIFFERENTIAL METHOD BLD: ABNORMAL
EOSINOPHIL # BLD AUTO: 0 K/UL (ref 0–0.5)
EOSINOPHIL NFR BLD: 0 % (ref 0–8)
ERYTHROCYTE [DISTWIDTH] IN BLOOD BY AUTOMATED COUNT: 13.9 % (ref 11.5–14.5)
HCT VFR BLD AUTO: 38.3 % (ref 37–48.5)
HGB BLD-MCNC: 12 G/DL (ref 12–16)
IMM GRANULOCYTES # BLD AUTO: 0.03 K/UL (ref 0–0.04)
IMM GRANULOCYTES NFR BLD AUTO: 0.7 % (ref 0–0.5)
LYMPHOCYTES # BLD AUTO: 0.8 K/UL (ref 1–4.8)
LYMPHOCYTES NFR BLD: 18 % (ref 18–48)
MCH RBC QN AUTO: 29.8 PG (ref 27–31)
MCHC RBC AUTO-ENTMCNC: 31.3 G/DL (ref 32–36)
MCV RBC AUTO: 95 FL (ref 82–98)
MONOCYTES # BLD AUTO: 0.4 K/UL (ref 0.3–1)
MONOCYTES NFR BLD: 8.4 % (ref 4–15)
NEUTROPHILS # BLD AUTO: 3.1 K/UL (ref 1.8–7.7)
NEUTROPHILS NFR BLD: 72.9 % (ref 38–73)
NRBC BLD-RTO: 0 /100 WBC
PLATELET # BLD AUTO: 122 K/UL (ref 150–450)
PMV BLD AUTO: 12.6 FL (ref 9.2–12.9)
RBC # BLD AUTO: 4.03 M/UL (ref 4–5.4)
TSH SERPL DL<=0.005 MIU/L-ACNC: 2 UIU/ML (ref 0.4–4)
WBC # BLD AUTO: 4.27 K/UL (ref 3.9–12.7)

## 2024-09-25 NOTE — TELEPHONE ENCOUNTER
----- Message from America Calvillo sent at 9/25/2024  2:48 PM CDT -----  Contact: Patient, [phone]  Patient is returning a phone call.    Who left a message for the patient: Lucita     Does patient know what this is regarding:  Lab results    Would you like a call back, or a response through your [facility] portal?:   Call back    Comments: Missed your call, please call her back. Thanks.

## 2024-10-08 ENCOUNTER — PATIENT OUTREACH (OUTPATIENT)
Dept: ADMINISTRATIVE | Facility: HOSPITAL | Age: 74
End: 2024-10-08
Payer: MEDICARE

## 2024-10-08 DIAGNOSIS — N18.32 STAGE 3B CHRONIC KIDNEY DISEASE: Primary | ICD-10-CM

## 2024-10-10 ENCOUNTER — LAB VISIT (OUTPATIENT)
Dept: LAB | Facility: HOSPITAL | Age: 74
End: 2024-10-10
Attending: FAMILY MEDICINE
Payer: MEDICARE

## 2024-10-10 DIAGNOSIS — D69.6 THROMBOCYTOPENIA: ICD-10-CM

## 2024-10-10 DIAGNOSIS — N18.32 STAGE 3B CHRONIC KIDNEY DISEASE: ICD-10-CM

## 2024-10-10 DIAGNOSIS — E83.51 HYPOCALCEMIA: ICD-10-CM

## 2024-10-10 PROCEDURE — 36415 COLL VENOUS BLD VENIPUNCTURE: CPT | Mod: PN | Performed by: FAMILY MEDICINE

## 2024-10-10 PROCEDURE — 85025 COMPLETE CBC W/AUTO DIFF WBC: CPT | Performed by: FAMILY MEDICINE

## 2024-10-10 PROCEDURE — 82310 ASSAY OF CALCIUM: CPT | Performed by: FAMILY MEDICINE

## 2024-10-10 PROCEDURE — 82570 ASSAY OF URINE CREATININE: CPT | Performed by: FAMILY MEDICINE

## 2024-10-11 LAB
ALBUMIN/CREAT UR: 4.7 UG/MG (ref 0–30)
BASOPHILS # BLD AUTO: 0 K/UL (ref 0–0.2)
BASOPHILS NFR BLD: 0 % (ref 0–1.9)
CALCIUM SERPL-MCNC: 10 MG/DL (ref 8.7–10.5)
CREAT UR-MCNC: 106 MG/DL (ref 15–325)
DIFFERENTIAL METHOD BLD: ABNORMAL
EOSINOPHIL # BLD AUTO: 0 K/UL (ref 0–0.5)
EOSINOPHIL NFR BLD: 0 % (ref 0–8)
ERYTHROCYTE [DISTWIDTH] IN BLOOD BY AUTOMATED COUNT: 13.3 % (ref 11.5–14.5)
HCT VFR BLD AUTO: 38.1 % (ref 37–48.5)
HGB BLD-MCNC: 12.5 G/DL (ref 12–16)
IMM GRANULOCYTES # BLD AUTO: 0.02 K/UL (ref 0–0.04)
IMM GRANULOCYTES NFR BLD AUTO: 0.4 % (ref 0–0.5)
LYMPHOCYTES # BLD AUTO: 1.2 K/UL (ref 1–4.8)
LYMPHOCYTES NFR BLD: 24.7 % (ref 18–48)
MCH RBC QN AUTO: 30.8 PG (ref 27–31)
MCHC RBC AUTO-ENTMCNC: 32.8 G/DL (ref 32–36)
MCV RBC AUTO: 94 FL (ref 82–98)
MICROALBUMIN UR DL<=1MG/L-MCNC: 5 UG/ML
MONOCYTES # BLD AUTO: 0.4 K/UL (ref 0.3–1)
MONOCYTES NFR BLD: 7.5 % (ref 4–15)
NEUTROPHILS # BLD AUTO: 3.1 K/UL (ref 1.8–7.7)
NEUTROPHILS NFR BLD: 67.4 % (ref 38–73)
NRBC BLD-RTO: 0 /100 WBC
PLATELET # BLD AUTO: 120 K/UL (ref 150–450)
PMV BLD AUTO: 12.9 FL (ref 9.2–12.9)
RBC # BLD AUTO: 4.06 M/UL (ref 4–5.4)
WBC # BLD AUTO: 4.65 K/UL (ref 3.9–12.7)

## 2024-10-14 DIAGNOSIS — D69.6 THROMBOCYTOPENIA: Primary | ICD-10-CM

## 2024-10-18 ENCOUNTER — TELEPHONE (OUTPATIENT)
Dept: INTERNAL MEDICINE | Facility: CLINIC | Age: 74
End: 2024-10-18
Payer: MEDICARE

## 2024-10-18 NOTE — TELEPHONE ENCOUNTER
Called and let patient know that Dr. Bolaños was not gravely concerned about her blood levels. He is wanting her to see a hematologist because she has had persistant blood levels. She verbalized understanding. She also asked that I cancel neurology appointment that was scheduled because she wants to see her external neuro doctor.

## 2024-10-18 NOTE — TELEPHONE ENCOUNTER
----- Message from Macie sent at 10/18/2024 10:39 AM CDT -----  Contact: [phone]  Type:  Needs Medical Advice    Who Called: María   Symptoms (please be specific): n/a    How long has patient had these symptoms:  n/a   Pharmacy name and phone #:    [facility] - [place] - [address]  [address]  [place] [address]  Phone: [phone] Fax: [phone]     Would the patient rather a call back or a response via [facility]? Call Back   Best Call Back Number: [phone]   Additional Information: pt is requesting a call in regards to seeing if her hematology appt scheduled in January a good appt or would she need to be seen sooner.      Thanks KB

## 2024-11-01 ENCOUNTER — TELEPHONE (OUTPATIENT)
Dept: INTERNAL MEDICINE | Facility: CLINIC | Age: 74
End: 2024-11-01
Payer: MEDICARE

## 2024-11-01 NOTE — TELEPHONE ENCOUNTER
Patient asking you to review her test result from Hematology-Marlen Guzmán (Review/Sign folder). Please advise.

## 2024-11-01 NOTE — TELEPHONE ENCOUNTER
----- Message from Evelin sent at 11/1/2024  3:40 PM CDT -----  Contact: María Daniel is calling to make sure the hematology reports were received. Please call her at [phone].    Thanks  Sl

## 2025-02-20 ENCOUNTER — OFFICE VISIT (OUTPATIENT)
Dept: INTERNAL MEDICINE | Facility: CLINIC | Age: 75
End: 2025-02-20
Payer: MEDICARE

## 2025-02-20 ENCOUNTER — RESULTS FOLLOW-UP (OUTPATIENT)
Dept: INTERNAL MEDICINE | Facility: CLINIC | Age: 75
End: 2025-02-20

## 2025-02-20 VITALS
SYSTOLIC BLOOD PRESSURE: 118 MMHG | HEIGHT: 62 IN | HEART RATE: 78 BPM | OXYGEN SATURATION: 96 % | BODY MASS INDEX: 22.55 KG/M2 | WEIGHT: 122.56 LBS | DIASTOLIC BLOOD PRESSURE: 72 MMHG

## 2025-02-20 DIAGNOSIS — J10.1 INFLUENZA A: Primary | ICD-10-CM

## 2025-02-20 LAB
CTP QC/QA: YES
CTP QC/QA: YES
POC MOLECULAR INFLUENZA A AGN: POSITIVE
POC MOLECULAR INFLUENZA B AGN: NEGATIVE
SARS-COV-2 RDRP RESP QL NAA+PROBE: NEGATIVE

## 2025-02-20 PROCEDURE — 87502 INFLUENZA DNA AMP PROBE: CPT | Mod: PBBFAC | Performed by: FAMILY MEDICINE

## 2025-02-20 PROCEDURE — 87635 SARS-COV-2 COVID-19 AMP PRB: CPT | Mod: PBBFAC | Performed by: FAMILY MEDICINE

## 2025-02-20 PROCEDURE — 99213 OFFICE O/P EST LOW 20 MIN: CPT | Mod: PBBFAC | Performed by: FAMILY MEDICINE

## 2025-02-20 RX ORDER — ALBUTEROL SULFATE 90 UG/1
2 INHALANT RESPIRATORY (INHALATION) EVERY 6 HOURS PRN
Qty: 18 G | Refills: 0 | Status: SHIPPED | OUTPATIENT
Start: 2025-02-20

## 2025-02-20 RX ORDER — OSELTAMIVIR PHOSPHATE 75 MG/1
75 CAPSULE ORAL 2 TIMES DAILY
Qty: 10 CAPSULE | Refills: 0 | Status: SHIPPED | OUTPATIENT
Start: 2025-02-20 | End: 2025-02-25

## 2025-02-20 RX ORDER — PROMETHAZINE HYDROCHLORIDE AND DEXTROMETHORPHAN HYDROBROMIDE 6.25; 15 MG/5ML; MG/5ML
5 SYRUP ORAL EVERY 6 HOURS PRN
Qty: 180 ML | Refills: 0 | Status: SHIPPED | OUTPATIENT
Start: 2025-02-20

## 2025-02-20 NOTE — PROGRESS NOTES
"Subjective:   Patient ID: María Henson is a 74 y.o. female.  Chief Complaint:  Cough    Presents for evaluation of URTI with cough   Evaluated February 17, 2025 at local urgent care   Flu and COVID testing negative   Prescribed closest cough syrup   Medication has not been effective for cough   Concerned because she only had symptoms 12th 24 hours testing she works around eligible people   No known COVID, flu, or strep exposure   No up-to-date flu or COVID vaccine  Mild low-grade fever with reported T-max 100.8°  Myalgias   Cough is not productive but feels like it is "in her chest"   No significant shortness a breath or dyspnea on exertion    Review of Systems   Constitutional:  Negative for chills, fatigue and fever.   HENT:  Positive for congestion, postnasal drip and rhinorrhea. Negative for dental problem, ear discharge, ear pain, sinus pressure, sinus pain, sneezing, sore throat and trouble swallowing.    Eyes:  Negative for discharge.   Respiratory:  Positive for cough. Negative for choking, chest tightness, shortness of breath, wheezing and stridor.    Cardiovascular:  Negative for chest pain and leg swelling.   Gastrointestinal:  Negative for diarrhea, nausea and vomiting.   Musculoskeletal:  Positive for myalgias.   Skin:  Negative for rash.     Objective:   /72 (BP Location: Left arm, Patient Position: Sitting)   Pulse 78   Ht 5' 2" (1.575 m)   Wt 55.6 kg (122 lb 9.2 oz)   SpO2 96%   BMI 22.42 kg/m²     Physical Exam  Vitals and nursing note reviewed.   Constitutional:       Appearance: She is well-developed and normal weight. She is not ill-appearing.   HENT:      Right Ear: Hearing, tympanic membrane and ear canal normal.      Left Ear: Hearing, tympanic membrane and ear canal normal.      Nose: Mucosal edema, congestion and rhinorrhea present.      Right Sinus: No maxillary sinus tenderness or frontal sinus tenderness.      Left Sinus: No maxillary sinus tenderness or frontal sinus " tenderness.      Mouth/Throat:      Pharynx: Oropharynx is clear. Uvula midline. Posterior oropharyngeal erythema present. No pharyngeal swelling or oropharyngeal exudate.   Eyes:      Conjunctiva/sclera: Conjunctivae normal.      Right eye: Right conjunctiva is not injected.      Left eye: Left conjunctiva is not injected.   Cardiovascular:      Rate and Rhythm: Normal rate and regular rhythm.      Heart sounds: Normal heart sounds.   Pulmonary:      Effort: Pulmonary effort is normal.      Breath sounds: Normal breath sounds and air entry.   Abdominal:      General: There is no distension.      Palpations: Abdomen is soft.      Tenderness: There is no abdominal tenderness.   Musculoskeletal:      Right lower leg: No edema.      Left lower leg: No edema.   Lymphadenopathy:      Cervical: No cervical adenopathy.   Skin:     Findings: No rash.       Assessment:       ICD-10-CM ICD-9-CM   1. Influenza A  J10.1 487.1     Plan:   Influenza A  -     POCT COVID-19 Rapid Screening  -     POCT Influenza A/B Molecular  -     albuterol (PROVENTIL/VENTOLIN HFA) 90 mcg/actuation inhaler; Inhale 2 puffs into the lungs every 6 (six) hours as needed for Wheezing or Shortness of Breath (or Cough).  Dispense: 18 g; Refill: 0  -     promethazine-dextromethorphan (PROMETHAZINE-DM) 6.25-15 mg/5 mL Syrp; Take 5 mLs by mouth every 6 (six) hours as needed (Cough).  Dispense: 180 mL; Refill: 0  -     oseltamivir (TAMIFLU) 75 MG capsule; Take 1 capsule (75 mg total) by mouth 2 (two) times daily. for 5 days  Dispense: 10 capsule; Refill: 0    Rapid COVID test negative   Rapid flu test positive   Take Tamiflu as prescribed  Take Phenergan DM  for cough and nasal congestion  Take albuterol inhaler for cough, wheezing, shortness for breath  Tylenol or Motrin as needed for fever, aches, or pain  Rest and Increase Fluids  If any worsening despite above treatment, or not better in 7-10 days, return to clinic.  Otherwise return to clinic as  needed

## 2025-02-21 DIAGNOSIS — Z00.00 ENCOUNTER FOR MEDICARE ANNUAL WELLNESS EXAM: ICD-10-CM

## 2025-02-24 ENCOUNTER — TELEPHONE (OUTPATIENT)
Dept: INTERNAL MEDICINE | Facility: CLINIC | Age: 75
End: 2025-02-24
Payer: MEDICARE

## 2025-02-24 NOTE — TELEPHONE ENCOUNTER
----- Message from Luiz sent at 2/24/2025 12:22 PM CST -----  Contact: self  .Type:  Needs Medical AdviceWho Called: .María Bañuelos (please be specific): severe cough How long has patient had these symptoms: couple weeksPharmacy name and phone #:  .[facility] [place] - [id number] [place] [address]Nitww: [phone] Fax: [phone] the patient rather a call back or a response via [facility]? Call [facility] Call Back Number: .[phone] Information: Pt states she still has bad cough after visit on 2/20 and is wanting to know if she is needing to come into the office again or be prescribed something else.

## 2025-02-24 NOTE — TELEPHONE ENCOUNTER
Spoke with the patient stated that she still have the cough. The patient was diagnose with the on 2/20/25. The patient was informed that her symptom can last for a week and the cough unfortunately can stay for a few more weeks.

## 2025-02-25 ENCOUNTER — TELEPHONE (OUTPATIENT)
Dept: INTERNAL MEDICINE | Facility: CLINIC | Age: 75
End: 2025-02-25
Payer: MEDICARE

## 2025-02-25 NOTE — TELEPHONE ENCOUNTER
----- Message from Luiz sent at 2/25/2025  2:52 PM CST -----  Contact: self  .Type:  Needs Medical AdviceWho Called: .María Tolbertptoms (please be specific):  Flu and UTIHow long has patient had these symptoms:  Pharmacy name and phone #:  Would the patient rather a call back or a response via [facility]? Call [facility] Call Back Number: .[phone] Information: Pt states she is needing a urinalysis due to a possible UTI. She also was diagnosed with the flu and is wondering if  is wanting to see her sooner than her delmar on 3/25.Pt also was prescribed Chlo hist 12.5 - 1 mg at the ER and is wanting a refill on that.

## 2025-02-25 NOTE — TELEPHONE ENCOUNTER
Called and scheduled patient for an appointment next week as requested by patient. Refused tomorrow since she still has the flu and can't do a virtual.

## 2025-03-04 ENCOUNTER — LAB VISIT (OUTPATIENT)
Dept: LAB | Facility: HOSPITAL | Age: 75
End: 2025-03-04
Attending: FAMILY MEDICINE
Payer: MEDICARE

## 2025-03-04 ENCOUNTER — OFFICE VISIT (OUTPATIENT)
Dept: INTERNAL MEDICINE | Facility: CLINIC | Age: 75
End: 2025-03-04
Payer: MEDICARE

## 2025-03-04 VITALS
SYSTOLIC BLOOD PRESSURE: 106 MMHG | BODY MASS INDEX: 22.44 KG/M2 | WEIGHT: 121.94 LBS | TEMPERATURE: 97 F | DIASTOLIC BLOOD PRESSURE: 70 MMHG | HEIGHT: 62 IN | HEART RATE: 82 BPM | OXYGEN SATURATION: 99 %

## 2025-03-04 DIAGNOSIS — Z79.899 ENCOUNTER FOR LONG-TERM (CURRENT) USE OF MEDICATIONS: ICD-10-CM

## 2025-03-04 DIAGNOSIS — F31.9 BIPOLAR 1 DISORDER: ICD-10-CM

## 2025-03-04 DIAGNOSIS — R39.198 ABNORMAL URINATION: ICD-10-CM

## 2025-03-04 DIAGNOSIS — E03.9 HYPOTHYROIDISM, UNSPECIFIED TYPE: ICD-10-CM

## 2025-03-04 DIAGNOSIS — D69.6 THROMBOCYTOPENIA: ICD-10-CM

## 2025-03-04 DIAGNOSIS — N18.31 STAGE 3A CHRONIC KIDNEY DISEASE: ICD-10-CM

## 2025-03-04 DIAGNOSIS — R39.198 ABNORMAL URINATION: Primary | ICD-10-CM

## 2025-03-04 LAB
BILIRUB UR QL STRIP: NEGATIVE
CLARITY UR: CLEAR
COLOR UR: YELLOW
GLUCOSE UR QL STRIP: NEGATIVE
HGB UR QL STRIP: NEGATIVE
KETONES UR QL STRIP: NEGATIVE
LEUKOCYTE ESTERASE UR QL STRIP: NEGATIVE
MICROSCOPIC COMMENT: ABNORMAL
NITRITE UR QL STRIP: NEGATIVE
NON-SQ EPI CELLS #/AREA URNS HPF: 2 /HPF
PH UR STRIP: 7 [PH] (ref 5–8)
PROT UR QL STRIP: NEGATIVE
RBC #/AREA URNS HPF: 0 /HPF (ref 0–4)
SP GR UR STRIP: 1.01 (ref 1–1.03)
SQUAMOUS #/AREA URNS HPF: 6 /HPF
URN SPEC COLLECT METH UR: NORMAL
UROBILINOGEN UR STRIP-ACNC: NEGATIVE EU/DL
WBC #/AREA URNS HPF: 2 /HPF (ref 0–5)

## 2025-03-04 PROCEDURE — 81000 URINALYSIS NONAUTO W/SCOPE: CPT | Performed by: FAMILY MEDICINE

## 2025-03-04 PROCEDURE — 99999 PR PBB SHADOW E&M-EST. PATIENT-LVL III: CPT | Mod: PBBFAC,,, | Performed by: FAMILY MEDICINE

## 2025-03-04 PROCEDURE — 99213 OFFICE O/P EST LOW 20 MIN: CPT | Mod: PBBFAC | Performed by: FAMILY MEDICINE

## 2025-03-04 PROCEDURE — 99214 OFFICE O/P EST MOD 30 MIN: CPT | Mod: S$PBB,,, | Performed by: FAMILY MEDICINE

## 2025-03-04 PROCEDURE — G2211 COMPLEX E/M VISIT ADD ON: HCPCS | Mod: S$PBB,,, | Performed by: FAMILY MEDICINE

## 2025-03-04 NOTE — PROGRESS NOTES
Subjective:       Patient ID: María Henson is a 74 y.o. female.    Chief Complaint: Annual Exam    HPI    Problem List[1]    Past Medical History:   Diagnosis Date    Bipolar 1 disorder     Hypothyroidism     Osteopenia        Past Surgical History:   Procedure Laterality Date    CYST REMOVAL      TONSILLECTOMY      WISDOM TOOTH EXTRACTION         Family History   Problem Relation Name Age of Onset    Heart failure Mother      Breast cancer Maternal Aunt      Breast cancer Paternal Aunt      Heart failure Brother      Diabetes Brother      Cancer Brother         Tobacco Use History[2]    Wt Readings from Last 5 Encounters:   03/04/25 55.3 kg (121 lb 14.6 oz)   02/20/25 55.6 kg (122 lb 9.2 oz)   10/08/24 57.2 kg (126 lb 3.2 oz)   09/24/24 57.5 kg (126 lb 12.2 oz)   03/15/24 56.4 kg (124 lb 5.4 oz)       For further HPI details, see assessment and plan.    Review of Systems    Objective:      Vitals:    03/04/25 1615   BP: 106/70   Pulse: 82   Temp: 96.7 °F (35.9 °C)       Physical Exam  Constitutional:       General: She is not in acute distress.     Appearance: She is not ill-appearing.   Pulmonary:      Effort: Pulmonary effort is normal. No respiratory distress.   Neurological:      General: No focal deficit present.      Mental Status: She is alert.   Psychiatric:         Mood and Affect: Mood normal.         Behavior: Behavior normal.         Assessment:       1. Abnormal urination    2. Encounter for long-term (current) use of medications    3. Hypothyroidism, unspecified type    4. Thrombocytopenia    5. Stage 3a chronic kidney disease    6. Bipolar 1 disorder        Plan:     presents for a checkup - stable w no active complaint / concern    She is just getting over an extended respiratory infection.  Additionally she was concerned she had a recent urinary tract infection as she was having some trouble with the urination but fortunately that issue has improved.    Reviewed our last encounter in September      Patient on Cytomel for hypothyroidism.  We will continue to monitor thyroid-stimulating hormone.    She has a history of constipation that had improve at our last visit.  Not necessarily constipation just irregular habits for which she is working with the gastroenterologist.    History of bipolar   Working with Psychiatry   She is doing well in his still stable.  Deferring medications to her psychiatry team  Reviewed drug list.  She is on a number of medications including lithium.  Has not had a lithium lab checked in awhile.  Reviewed up-to-date.  Recommendations 6-12 months once stable.  I will update this in about a month so she will be at the six-month lucio.  Encouraged her to discuss with her psychiatrist in regards to deferring monitoring to that physician.    Patient is up-to-date on bone density monitoring and breast cancer screening.      She is due for colon cancer screening it had to be rescheduled but it is being rescheduled    Chronic kidney disease stage 3 a   Continue to monitor.  Updating labs.  Avoid NSAIDs.      Thrombocytopenia   Patient did see a hematologist.  Unfortunately do not have those medical records.  We will ask for her signature so we can get at records sent to us so I can review at our next encounter    Patient was not interested in vaccine at present time    Lab 1 mo  See me 6 month    This note was verbally dictated, please excuse any type errors.         [1]   Patient Active Problem List  Diagnosis    CKD (chronic kidney disease) stage 3, GFR 30-59 ml/min    Bipolar 1 disorder    Hypothyroidism    Cognitive complaints with normal exam    Memory impairment    Lithium use    Unsteady gait when walking    Bipolar disorder, current episode mixed, severe, with psychotic features    Thrombocytopenia    Stage 3a chronic kidney disease   [2]   Social History  Tobacco Use   Smoking Status Former    Current packs/day: 0.00    Average packs/day: 0.3 packs/day for 1 year (0.3 ttl pk-yrs)     Types: Cigarettes    Start date:     Quit date:     Years since quittin.2    Passive exposure: Past   Smokeless Tobacco Never

## 2025-03-05 ENCOUNTER — RESULTS FOLLOW-UP (OUTPATIENT)
Dept: INTERNAL MEDICINE | Facility: CLINIC | Age: 75
End: 2025-03-05

## 2025-04-15 ENCOUNTER — LAB VISIT (OUTPATIENT)
Dept: LAB | Facility: HOSPITAL | Age: 75
End: 2025-04-15
Attending: FAMILY MEDICINE
Payer: MEDICARE

## 2025-04-15 DIAGNOSIS — Z79.899 ENCOUNTER FOR LONG-TERM (CURRENT) USE OF MEDICATIONS: ICD-10-CM

## 2025-04-15 DIAGNOSIS — E03.9 HYPOTHYROIDISM, UNSPECIFIED TYPE: ICD-10-CM

## 2025-04-15 LAB
ABSOLUTE EOSINOPHIL (OHS): 0 K/UL
ABSOLUTE MONOCYTE (OHS): 0.42 K/UL (ref 0.3–1)
ABSOLUTE NEUTROPHIL COUNT (OHS): 2.65 K/UL (ref 1.8–7.7)
ALBUMIN SERPL BCP-MCNC: 4.3 G/DL (ref 3.5–5.2)
ALP SERPL-CCNC: 57 UNIT/L (ref 40–150)
ALT SERPL W/O P-5'-P-CCNC: 24 UNIT/L (ref 10–44)
ANION GAP (OHS): 8 MMOL/L (ref 8–16)
AST SERPL-CCNC: 28 UNIT/L (ref 11–45)
BASOPHILS # BLD AUTO: 0 K/UL
BASOPHILS NFR BLD AUTO: 0 %
BILIRUB SERPL-MCNC: 0.5 MG/DL (ref 0.1–1)
BUN SERPL-MCNC: 20 MG/DL (ref 8–23)
CALCIUM SERPL-MCNC: 9.9 MG/DL (ref 8.7–10.5)
CHLORIDE SERPL-SCNC: 108 MMOL/L (ref 95–110)
CHOLEST SERPL-MCNC: 176 MG/DL (ref 120–199)
CHOLEST/HDLC SERPL: 3.6 {RATIO} (ref 2–5)
CO2 SERPL-SCNC: 26 MMOL/L (ref 23–29)
CREAT SERPL-MCNC: 0.9 MG/DL (ref 0.5–1.4)
EAG (OHS): 97 MG/DL (ref 68–131)
ERYTHROCYTE [DISTWIDTH] IN BLOOD BY AUTOMATED COUNT: 13.8 % (ref 11.5–14.5)
GFR SERPLBLD CREATININE-BSD FMLA CKD-EPI: >60 ML/MIN/1.73/M2
GLUCOSE SERPL-MCNC: 83 MG/DL (ref 70–110)
HBA1C MFR BLD: 5 % (ref 4–5.6)
HCT VFR BLD AUTO: 40.2 % (ref 37–48.5)
HDLC SERPL-MCNC: 49 MG/DL (ref 40–75)
HDLC SERPL: 27.8 % (ref 20–50)
HGB BLD-MCNC: 12.8 GM/DL (ref 12–16)
IMM GRANULOCYTES # BLD AUTO: 0.02 K/UL (ref 0–0.04)
IMM GRANULOCYTES NFR BLD AUTO: 0.5 % (ref 0–0.5)
LDLC SERPL CALC-MCNC: 113 MG/DL (ref 63–159)
LITHIUM SERPL-SCNC: <0.1 MMOL/L (ref 0.6–1.2)
LYMPHOCYTES # BLD AUTO: 0.93 K/UL (ref 1–4.8)
MCH RBC QN AUTO: 30.1 PG (ref 27–31)
MCHC RBC AUTO-ENTMCNC: 31.8 G/DL (ref 32–36)
MCV RBC AUTO: 95 FL (ref 82–98)
NONHDLC SERPL-MCNC: 127 MG/DL
NUCLEATED RBC (/100WBC) (OHS): 0 /100 WBC
PLATELET # BLD AUTO: 124 K/UL (ref 150–450)
PMV BLD AUTO: 12.8 FL (ref 9.2–12.9)
POTASSIUM SERPL-SCNC: 4.4 MMOL/L (ref 3.5–5.1)
PROT SERPL-MCNC: 7.3 GM/DL (ref 6–8.4)
RBC # BLD AUTO: 4.25 M/UL (ref 4–5.4)
RELATIVE EOSINOPHIL (OHS): 0 %
RELATIVE LYMPHOCYTE (OHS): 23.1 % (ref 18–48)
RELATIVE MONOCYTE (OHS): 10.4 % (ref 4–15)
RELATIVE NEUTROPHIL (OHS): 66 % (ref 38–73)
SODIUM SERPL-SCNC: 142 MMOL/L (ref 136–145)
TRIGL SERPL-MCNC: 70 MG/DL (ref 30–150)
TSH SERPL-ACNC: 3.23 UIU/ML (ref 0.4–4)
WBC # BLD AUTO: 4.02 K/UL (ref 3.9–12.7)

## 2025-04-15 PROCEDURE — 82465 ASSAY BLD/SERUM CHOLESTEROL: CPT

## 2025-04-15 PROCEDURE — 80053 COMPREHEN METABOLIC PANEL: CPT

## 2025-04-15 PROCEDURE — 83036 HEMOGLOBIN GLYCOSYLATED A1C: CPT

## 2025-04-15 PROCEDURE — 80178 ASSAY OF LITHIUM: CPT

## 2025-04-15 PROCEDURE — 84443 ASSAY THYROID STIM HORMONE: CPT

## 2025-04-15 PROCEDURE — 36415 COLL VENOUS BLD VENIPUNCTURE: CPT | Mod: PN

## 2025-04-15 PROCEDURE — 85025 COMPLETE CBC W/AUTO DIFF WBC: CPT

## 2025-05-14 RX ORDER — LIOTHYRONINE SODIUM 5 UG/1
TABLET ORAL
Qty: 180 TABLET | Refills: 3 | Status: SHIPPED | OUTPATIENT
Start: 2025-05-14 | End: 2025-05-14 | Stop reason: SDUPTHER

## 2025-05-14 RX ORDER — LIOTHYRONINE SODIUM 5 UG/1
TABLET ORAL
Qty: 180 TABLET | Refills: 3 | Status: SHIPPED | OUTPATIENT
Start: 2025-05-14

## 2025-05-14 NOTE — TELEPHONE ENCOUNTER
Refill Decision Note   María Sixto  is requesting a refill authorization.  Brief Assessment and Rationale for Refill:  Approve     Medication Therapy Plan:         Comments:     Note composed:1:13 PM 05/14/2025

## 2025-05-14 NOTE — TELEPHONE ENCOUNTER
No care due was identified.  [facility] Embedded Care Due Messages. Reference number: [id number].   5/14/2025 10:03:00 AM CDT

## 2025-05-14 NOTE — TELEPHONE ENCOUNTER
----- Message from Liz sent at 5/14/2025  4:40 PM CDT -----  Contact: María  .Type:  RX Refill RequestWho Called: PatientRefill or New Rx:RefillRX Name and Strengthliothyronine (CYTOMEL) 5 MCG Tab:How is the patient currently taking it? (ex. 1XDay):2 a dayIs this a 30 day or 90 day RX:30 Preferred Pharmacy with phone number:.[facility] - [place] - [address] [place] [address]Tutjw: [phone] Fax: [phone] or Mail Order:localOrdering Provider:Aris the patient rather a call back or a response via [facility]? [facility] Call Back Number:.[phone].Additional Information:

## 2025-05-14 NOTE — TELEPHONE ENCOUNTER
No care due was identified.  [facility] Embedded Care Due Messages. Reference number: [id number].   5/14/2025 4:44:19 PM CDT

## 2025-06-18 ENCOUNTER — PATIENT MESSAGE (OUTPATIENT)
Dept: INTERNAL MEDICINE | Facility: CLINIC | Age: 75
End: 2025-06-18
Payer: MEDICARE

## 2025-06-18 ENCOUNTER — TELEPHONE (OUTPATIENT)
Dept: INTERNAL MEDICINE | Facility: CLINIC | Age: 75
End: 2025-06-18
Payer: MEDICARE

## 2025-06-18 NOTE — TELEPHONE ENCOUNTER
Called and told the patient she would need to do a visit or an evisit. Sent her the info for an evisit. She stated that she would try.   Told her to call back if she needed a virtual or in person with an AIDEN>     Copied from CRM #[id number]. Topic: Appointments - Amb Referral  >> Jun 18, 2025  8:53 AM Felicita wrote:  .Type:  Patient Requesting Call    Who Called:María  Does the patient know what this is regarding?:pt called to see if the nurse can put some orders in for her to do a urine (she mentioned that her symptoms are odor (been having for 2days) & burning (today)  Would the patient rather a call back or a response via [facility]? Call back  [facility] Call Back Number:.[phone]   Additional Information: